# Patient Record
Sex: FEMALE | Race: BLACK OR AFRICAN AMERICAN | NOT HISPANIC OR LATINO | URBAN - METROPOLITAN AREA
[De-identification: names, ages, dates, MRNs, and addresses within clinical notes are randomized per-mention and may not be internally consistent; named-entity substitution may affect disease eponyms.]

---

## 2020-10-01 ENCOUNTER — INPATIENT (INPATIENT)
Facility: HOSPITAL | Age: 65
LOS: 4 days | Discharge: HOME HEALTH | End: 2020-10-06
Attending: SURGERY | Admitting: SURGERY
Payer: COMMERCIAL

## 2020-10-01 VITALS
SYSTOLIC BLOOD PRESSURE: 155 MMHG | WEIGHT: 214.95 LBS | OXYGEN SATURATION: 97 % | HEIGHT: 62 IN | TEMPERATURE: 98 F | HEART RATE: 70 BPM | RESPIRATION RATE: 18 BRPM | DIASTOLIC BLOOD PRESSURE: 69 MMHG

## 2020-10-01 DIAGNOSIS — Z90.710 ACQUIRED ABSENCE OF BOTH CERVIX AND UTERUS: Chronic | ICD-10-CM

## 2020-10-01 DIAGNOSIS — Z98.891 HISTORY OF UTERINE SCAR FROM PREVIOUS SURGERY: Chronic | ICD-10-CM

## 2020-10-01 LAB
ALBUMIN SERPL ELPH-MCNC: 3.6 G/DL — SIGNIFICANT CHANGE UP (ref 3.3–5)
ALP SERPL-CCNC: 81 U/L — SIGNIFICANT CHANGE UP (ref 40–120)
ALT FLD-CCNC: 86 U/L — HIGH (ref 12–78)
ANION GAP SERPL CALC-SCNC: 7 MMOL/L — SIGNIFICANT CHANGE UP (ref 5–17)
APPEARANCE UR: CLEAR — SIGNIFICANT CHANGE UP
AST SERPL-CCNC: 33 U/L — SIGNIFICANT CHANGE UP (ref 15–37)
BASOPHILS # BLD AUTO: 0.04 K/UL — SIGNIFICANT CHANGE UP (ref 0–0.2)
BASOPHILS NFR BLD AUTO: 0.4 % — SIGNIFICANT CHANGE UP (ref 0–2)
BILIRUB SERPL-MCNC: 0.5 MG/DL — SIGNIFICANT CHANGE UP (ref 0.2–1.2)
BILIRUB UR-MCNC: NEGATIVE — SIGNIFICANT CHANGE UP
BUN SERPL-MCNC: 11 MG/DL — SIGNIFICANT CHANGE UP (ref 7–23)
CALCIUM SERPL-MCNC: 9 MG/DL — SIGNIFICANT CHANGE UP (ref 8.5–10.1)
CHLORIDE SERPL-SCNC: 105 MMOL/L — SIGNIFICANT CHANGE UP (ref 96–108)
CO2 SERPL-SCNC: 27 MMOL/L — SIGNIFICANT CHANGE UP (ref 22–31)
COLOR SPEC: YELLOW — SIGNIFICANT CHANGE UP
CREAT SERPL-MCNC: 0.87 MG/DL — SIGNIFICANT CHANGE UP (ref 0.5–1.3)
DIFF PNL FLD: NEGATIVE — SIGNIFICANT CHANGE UP
EOSINOPHIL # BLD AUTO: 0.13 K/UL — SIGNIFICANT CHANGE UP (ref 0–0.5)
EOSINOPHIL NFR BLD AUTO: 1.2 % — SIGNIFICANT CHANGE UP (ref 0–6)
GLUCOSE BLDC GLUCOMTR-MCNC: 117 MG/DL — HIGH (ref 70–99)
GLUCOSE BLDC GLUCOMTR-MCNC: 151 MG/DL — HIGH (ref 70–99)
GLUCOSE BLDC GLUCOMTR-MCNC: 162 MG/DL — HIGH (ref 70–99)
GLUCOSE SERPL-MCNC: 230 MG/DL — HIGH (ref 70–99)
GLUCOSE UR QL: 50 MG/DL
HCT VFR BLD CALC: 39.8 % — SIGNIFICANT CHANGE UP (ref 34.5–45)
HGB BLD-MCNC: 12.8 G/DL — SIGNIFICANT CHANGE UP (ref 11.5–15.5)
IMM GRANULOCYTES NFR BLD AUTO: 0.3 % — SIGNIFICANT CHANGE UP (ref 0–1.5)
KETONES UR-MCNC: NEGATIVE — SIGNIFICANT CHANGE UP
LEUKOCYTE ESTERASE UR-ACNC: NEGATIVE — SIGNIFICANT CHANGE UP
LIDOCAIN IGE QN: 168 U/L — SIGNIFICANT CHANGE UP (ref 73–393)
LYMPHOCYTES # BLD AUTO: 2.95 K/UL — SIGNIFICANT CHANGE UP (ref 1–3.3)
LYMPHOCYTES # BLD AUTO: 27.4 % — SIGNIFICANT CHANGE UP (ref 13–44)
MCHC RBC-ENTMCNC: 28.1 PG — SIGNIFICANT CHANGE UP (ref 27–34)
MCHC RBC-ENTMCNC: 32.2 GM/DL — SIGNIFICANT CHANGE UP (ref 32–36)
MCV RBC AUTO: 87.5 FL — SIGNIFICANT CHANGE UP (ref 80–100)
MONOCYTES # BLD AUTO: 0.58 K/UL — SIGNIFICANT CHANGE UP (ref 0–0.9)
MONOCYTES NFR BLD AUTO: 5.4 % — SIGNIFICANT CHANGE UP (ref 2–14)
NEUTROPHILS # BLD AUTO: 7.03 K/UL — SIGNIFICANT CHANGE UP (ref 1.8–7.4)
NEUTROPHILS NFR BLD AUTO: 65.3 % — SIGNIFICANT CHANGE UP (ref 43–77)
NITRITE UR-MCNC: NEGATIVE — SIGNIFICANT CHANGE UP
NRBC # BLD: 0 /100 WBCS — SIGNIFICANT CHANGE UP (ref 0–0)
PH UR: 6 — SIGNIFICANT CHANGE UP (ref 5–8)
PLATELET # BLD AUTO: 300 K/UL — SIGNIFICANT CHANGE UP (ref 150–400)
POTASSIUM SERPL-MCNC: 3.6 MMOL/L — SIGNIFICANT CHANGE UP (ref 3.5–5.3)
POTASSIUM SERPL-SCNC: 3.6 MMOL/L — SIGNIFICANT CHANGE UP (ref 3.5–5.3)
PROT SERPL-MCNC: 8.4 GM/DL — HIGH (ref 6–8.3)
PROT UR-MCNC: NEGATIVE MG/DL — SIGNIFICANT CHANGE UP
RBC # BLD: 4.55 M/UL — SIGNIFICANT CHANGE UP (ref 3.8–5.2)
RBC # FLD: 13 % — SIGNIFICANT CHANGE UP (ref 10.3–14.5)
SARS-COV-2 RNA SPEC QL NAA+PROBE: SIGNIFICANT CHANGE UP
SODIUM SERPL-SCNC: 139 MMOL/L — SIGNIFICANT CHANGE UP (ref 135–145)
SP GR SPEC: 1.01 — SIGNIFICANT CHANGE UP (ref 1.01–1.02)
UROBILINOGEN FLD QL: NEGATIVE MG/DL — SIGNIFICANT CHANGE UP
WBC # BLD: 10.76 K/UL — HIGH (ref 3.8–10.5)
WBC # FLD AUTO: 10.76 K/UL — HIGH (ref 3.8–10.5)

## 2020-10-01 PROCEDURE — 93010 ELECTROCARDIOGRAM REPORT: CPT

## 2020-10-01 PROCEDURE — 99222 1ST HOSP IP/OBS MODERATE 55: CPT

## 2020-10-01 PROCEDURE — 99285 EMERGENCY DEPT VISIT HI MDM: CPT

## 2020-10-01 PROCEDURE — 74177 CT ABD & PELVIS W/CONTRAST: CPT | Mod: 26

## 2020-10-01 PROCEDURE — 99221 1ST HOSP IP/OBS SF/LOW 40: CPT

## 2020-10-01 RX ORDER — GLUCAGON INJECTION, SOLUTION 0.5 MG/.1ML
1 INJECTION, SOLUTION SUBCUTANEOUS ONCE
Refills: 0 | Status: DISCONTINUED | OUTPATIENT
Start: 2020-10-01 | End: 2020-10-06

## 2020-10-01 RX ORDER — DEXTROSE 50 % IN WATER 50 %
12.5 SYRINGE (ML) INTRAVENOUS ONCE
Refills: 0 | Status: DISCONTINUED | OUTPATIENT
Start: 2020-10-01 | End: 2020-10-06

## 2020-10-01 RX ORDER — LISINOPRIL 2.5 MG/1
40 TABLET ORAL DAILY
Refills: 0 | Status: DISCONTINUED | OUTPATIENT
Start: 2020-10-01 | End: 2020-10-06

## 2020-10-01 RX ORDER — MULTIVIT-MIN/FERROUS GLUCONATE 9 MG/15 ML
1 LIQUID (ML) ORAL
Qty: 0 | Refills: 0 | DISCHARGE

## 2020-10-01 RX ORDER — NIFEDIPINE 30 MG
60 TABLET, EXTENDED RELEASE 24 HR ORAL DAILY
Refills: 0 | Status: DISCONTINUED | OUTPATIENT
Start: 2020-10-01 | End: 2020-10-06

## 2020-10-01 RX ORDER — ONDANSETRON 8 MG/1
4 TABLET, FILM COATED ORAL EVERY 6 HOURS
Refills: 0 | Status: DISCONTINUED | OUTPATIENT
Start: 2020-10-01 | End: 2020-10-06

## 2020-10-01 RX ORDER — MORPHINE SULFATE 50 MG/1
2 CAPSULE, EXTENDED RELEASE ORAL EVERY 6 HOURS
Refills: 0 | Status: DISCONTINUED | OUTPATIENT
Start: 2020-10-01 | End: 2020-10-06

## 2020-10-01 RX ORDER — ONDANSETRON 8 MG/1
4 TABLET, FILM COATED ORAL ONCE
Refills: 0 | Status: COMPLETED | OUTPATIENT
Start: 2020-10-01 | End: 2020-10-01

## 2020-10-01 RX ORDER — SODIUM CHLORIDE 9 MG/ML
1000 INJECTION, SOLUTION INTRAVENOUS
Refills: 0 | Status: DISCONTINUED | OUTPATIENT
Start: 2020-10-01 | End: 2020-10-06

## 2020-10-01 RX ORDER — PIPERACILLIN AND TAZOBACTAM 4; .5 G/20ML; G/20ML
3.38 INJECTION, POWDER, LYOPHILIZED, FOR SOLUTION INTRAVENOUS EVERY 8 HOURS
Refills: 0 | Status: DISCONTINUED | OUTPATIENT
Start: 2020-10-01 | End: 2020-10-06

## 2020-10-01 RX ORDER — METFORMIN HYDROCHLORIDE 850 MG/1
1 TABLET ORAL
Qty: 0 | Refills: 0 | DISCHARGE

## 2020-10-01 RX ORDER — DEXTROSE 50 % IN WATER 50 %
25 SYRINGE (ML) INTRAVENOUS ONCE
Refills: 0 | Status: DISCONTINUED | OUTPATIENT
Start: 2020-10-01 | End: 2020-10-06

## 2020-10-01 RX ORDER — DEXTROSE 50 % IN WATER 50 %
15 SYRINGE (ML) INTRAVENOUS ONCE
Refills: 0 | Status: DISCONTINUED | OUTPATIENT
Start: 2020-10-01 | End: 2020-10-06

## 2020-10-01 RX ORDER — INFLUENZA VIRUS VACCINE 15; 15; 15; 15 UG/.5ML; UG/.5ML; UG/.5ML; UG/.5ML
0.5 SUSPENSION INTRAMUSCULAR ONCE
Refills: 0 | Status: DISCONTINUED | OUTPATIENT
Start: 2020-10-01 | End: 2020-10-06

## 2020-10-01 RX ORDER — SODIUM CHLORIDE 9 MG/ML
1000 INJECTION INTRAMUSCULAR; INTRAVENOUS; SUBCUTANEOUS ONCE
Refills: 0 | Status: COMPLETED | OUTPATIENT
Start: 2020-10-01 | End: 2020-10-01

## 2020-10-01 RX ORDER — KETOROLAC TROMETHAMINE 30 MG/ML
15 SYRINGE (ML) INJECTION ONCE
Refills: 0 | Status: DISCONTINUED | OUTPATIENT
Start: 2020-10-01 | End: 2020-10-01

## 2020-10-01 RX ORDER — NIFEDIPINE 30 MG
1 TABLET, EXTENDED RELEASE 24 HR ORAL
Qty: 0 | Refills: 0 | DISCHARGE

## 2020-10-01 RX ORDER — ENOXAPARIN SODIUM 100 MG/ML
40 INJECTION SUBCUTANEOUS DAILY
Refills: 0 | Status: DISCONTINUED | OUTPATIENT
Start: 2020-10-01 | End: 2020-10-01

## 2020-10-01 RX ORDER — INSULIN LISPRO 100/ML
VIAL (ML) SUBCUTANEOUS EVERY 6 HOURS
Refills: 0 | Status: DISCONTINUED | OUTPATIENT
Start: 2020-10-01 | End: 2020-10-04

## 2020-10-01 RX ORDER — PIPERACILLIN AND TAZOBACTAM 4; .5 G/20ML; G/20ML
3.38 INJECTION, POWDER, LYOPHILIZED, FOR SOLUTION INTRAVENOUS ONCE
Refills: 0 | Status: COMPLETED | OUTPATIENT
Start: 2020-10-01 | End: 2020-10-01

## 2020-10-01 RX ADMIN — Medication 15 MILLIGRAM(S): at 09:32

## 2020-10-01 RX ADMIN — SODIUM CHLORIDE 1000 MILLILITER(S): 9 INJECTION INTRAMUSCULAR; INTRAVENOUS; SUBCUTANEOUS at 09:03

## 2020-10-01 RX ADMIN — SODIUM CHLORIDE 125 MILLILITER(S): 9 INJECTION, SOLUTION INTRAVENOUS at 17:52

## 2020-10-01 RX ADMIN — SODIUM CHLORIDE 125 MILLILITER(S): 9 INJECTION, SOLUTION INTRAVENOUS at 23:56

## 2020-10-01 RX ADMIN — ONDANSETRON 4 MILLIGRAM(S): 8 TABLET, FILM COATED ORAL at 09:29

## 2020-10-01 RX ADMIN — LISINOPRIL 40 MILLIGRAM(S): 2.5 TABLET ORAL at 17:45

## 2020-10-01 RX ADMIN — PIPERACILLIN AND TAZOBACTAM 200 GRAM(S): 4; .5 INJECTION, POWDER, LYOPHILIZED, FOR SOLUTION INTRAVENOUS at 13:56

## 2020-10-01 RX ADMIN — Medication 15 MILLIGRAM(S): at 09:03

## 2020-10-01 RX ADMIN — Medication 60 MILLIGRAM(S): at 17:45

## 2020-10-01 RX ADMIN — PIPERACILLIN AND TAZOBACTAM 25 GRAM(S): 4; .5 INJECTION, POWDER, LYOPHILIZED, FOR SOLUTION INTRAVENOUS at 23:39

## 2020-10-01 NOTE — ED PROVIDER NOTE - OBJECTIVE STATEMENT
63yo F w/ PMH HTN, DM pw several days of diffuse lower abd discomfort, pressure which radiates into her low back. Pt describes pain as twisting, cramping. Last BM 5-6 days ago. Denies previous similar. Pt taking metamucil w/o relief. Pt endorses nausea, feeling flushed / chills, sweaty this AM. Pt endorses urinary frequency. ROS otherwise neg: Denies fever, CP, SOB, vomiting, diarrhea, dysuria.     PMH HTN, DM, meds quinapril, metformin, nifedipine, NKDA, PSH , fibroids. 65yo F w/ PMH HTN, DM pw several days of diffuse lower abd discomfort, pressure which radiates into her low back. Pt describes pain as twisting, cramping. Last BM 5-6 days ago. Denies previous similar. Pt taking metamucil w/o relief. Pt endorses nausea, feeling flushed / chills, sweaty this AM. Pt endorses urinary frequency. ROS otherwise neg: Denies fever, CP, SOB, vomiting, diarrhea, dysuria.     PMH HTN, DM, meds quinapril, metformin, nifedipine, NKDA, PSH , total hysterectomy

## 2020-10-01 NOTE — CONSULT NOTE ADULT - SUBJECTIVE AND OBJECTIVE BOX
IR consulted for pelvic drain placement.  Pt c/o worsening pelvic pain x 1 week, constipation, also hot/col chills.            HPI:  65 female with PMH of HTN, DM, PSH: GISELLE, present to ER with complaints of lower abdominal pain X2days. Pt states she started having mild abdominal pain and constipation last week, she took metamucil without any relief, only hand small hard "pebble-like" stool. Pt states abdominal pain progressively got worse and this morning is 10/10, sharp, radiating to lower back, pain is worse when she valsalva. Denies N/V/D, no fever/chill, NO CP/SOB, No urinary urgency, frequency or dysuria. Last BM was 4days ago, +flatus. (01 Oct 2020 12:45)          PAST MEDICAL & SURGICAL HISTORY:  HTN (hypertension)    DM (diabetes mellitus)    H/O:     S/P GISELLE (total abdominal hysterectomy)        Allergies    No Known Allergies    Intolerances        MEDICATIONS  (STANDING):  dextrose 5%. 1000 milliLiter(s) (50 mL/Hr) IV Continuous <Continuous>  dextrose 50% Injectable 12.5 Gram(s) IV Push once  dextrose 50% Injectable 25 Gram(s) IV Push once  dextrose 50% Injectable 25 Gram(s) IV Push once  insulin lispro (HumaLOG) corrective regimen sliding scale   SubCutaneous every 6 hours  lisinopril 40 milliGRAM(s) Oral daily  NIFEdipine XL 60 milliGRAM(s) Oral daily  piperacillin/tazobactam IVPB.. 3.375 Gram(s) IV Intermittent every 8 hours  sodium chloride 0.45%. 1000 milliLiter(s) (125 mL/Hr) IV Continuous <Continuous>    MEDICATIONS  (PRN):  dextrose 40% Gel 15 Gram(s) Oral once PRN Blood Glucose LESS THAN 70 milliGRAM(s)/deciliter  glucagon  Injectable 1 milliGRAM(s) IntraMuscular once PRN Glucose LESS THAN 70 milligrams/deciliter  morphine  - Injectable 2 milliGRAM(s) IV Push every 6 hours PRN Moderate Pain (4 - 6)  ondansetron Injectable 4 milliGRAM(s) IV Push every 6 hours PRN Nausea and/or Vomiting        SOCIAL HISTORY:    FAMILY HISTORY:  FH: type 2 diabetes    FH: HTN (hypertension)          PHYSICAL EXAM:    Vital Signs Last 24 Hrs  T(C): 36.9 (01 Oct 2020 11:35), Max: 36.9 (01 Oct 2020 11:35)  T(F): 98.4 (01 Oct 2020 11:35), Max: 98.4 (01 Oct 2020 11:35)  HR: 70 (01 Oct 2020 11:35) (70 - 70)  BP: 140/79 (01 Oct 2020 11:35) (140/79 - 155/69)  BP(mean): --  RR: 18 (01 Oct 2020 11:35) (18 - 18)  SpO2: 96% (01 Oct 2020 11:35) (96% - 97%)    General:  Appears stated age, well-groomed, well-nourished, no distress  Lungs:  CTAB  Cardiovascular:  good S1, S2,   Abdomen:  see A/P; obese   Extremities:  no calf tenderness/swelling b/l  Neuro/Psych:  A &O x 3    LABS:                        12.8   10.76 )-----------( 300      ( 01 Oct 2020 09:10 )             39.8     10-    139  |  105  |  11  ----------------------------<  230<H>  3.6   |  27  |  0.87    Ca    9.0      01 Oct 2020 09:10    TPro  8.4<H>  /  Alb  3.6  /  TBili  0.5  /  DBili  x   /  AST  33  /  ALT  86<H>  /  AlkPhos  81  10-01      Urinalysis Basic - ( 01 Oct 2020 11:08 )    Color: Yellow / Appearance: Clear / S.010 / pH: x  Gluc: x / Ketone: Negative  / Bili: Negative / Urobili: Negative mg/dL   Blood: x / Protein: Negative mg/dL / Nitrite: Negative   Leuk Esterase: Negative / RBC: x / WBC x   Sq Epi: x / Non Sq Epi: x / Bacteria: x        RADIOLOGY & ADDITIONAL STUDIES:  < from: CT Abdomen and Pelvis w/ IV Cont (10.01.20 @ 11:17) >  IMPRESSION:  4.4 x 5.2 x 5.5 cm collection in the cul-de-sac, likely diverticular abscess as above. Differential diagnosis includes cystic ovarian mass if patient has not had oophorectomy.    Nonobstructing left renal calculus.    Hepatomegaly and hepatic steatosis.    < end of copied text >

## 2020-10-01 NOTE — ED ADULT NURSE NOTE - OBJECTIVE STATEMENT
pt a&O x4, pt c.o of LLQ abd pressure, fullness radiating to left flank. pt denies hematuria, N/V/D. pt states last bm sunday hard to pass very minimal.

## 2020-10-01 NOTE — H&P ADULT - NSHPPHYSICALEXAM_GEN_ALL_CORE
GEN: Awake, alert, interactive.   HEAD AND NECK: NC/AT. Airway patent. Neck supple.   EYES:  Clear b/l. EOMI. PERRL.   ENT: Moist mucus membranes.   CARDIAC: Regular rate, regular rhythm. No evident pedal edema.    RESP/CHEST: Normal respiratory effort with no use of accessory muscles or retractions. Clear throughout on auscultation.  ABD: Soft, obese, TTP in lower abd. No rebound, no guarding.   BACK: No midline spinal TTP. No CVAT.   EXTREMITIES: Moving all extremities with no apparent deformities.   SKIN: Warm, dry, intact normal color. No rash.   NEURO: AOx3, CN II-XII grossly intact, no focal deficits.   PSYCH: Appropriate mood and affect.

## 2020-10-01 NOTE — H&P ADULT - HISTORY OF PRESENT ILLNESS
65 female with PMH of HTN, DM, PSH: GISELLE, present to ER with complaints of lower abdominal pain X2days. Pt states she started having mild abdominal pain and constipation last week, she took metamucil without any relief, only hand small hard "pebble-like" stool. Pt states abdominal pain progressively got worse and this morning is 10/10, sharp, radiating to lower back, pain is worse when she valsalva. Denies N/V/D, no fever/chill, NO CP/SOB, No urinary urgency, frequency or dysuria. Last BM was 4days ago, +flatus.

## 2020-10-01 NOTE — ED ADULT NURSE NOTE - BREATH SOUNDS, MLM
Dr. Wegener,   Patient rescheduled appointment on 2/11/2020 at 9 am.      I cancelled today's appt. I pended the 2 meds and pharmacy loaded.      Thanks! Soco Pimentel RN      Clear

## 2020-10-01 NOTE — H&P ADULT - ATTENDING COMMENTS
Ms. Mendoza seen during IR procedure. Overall improving. Hematoma, possibly infected aspirated and drain placed. Will continue broad spectrum antibiotics and follow-up drain results.

## 2020-10-01 NOTE — ED PROVIDER NOTE - CLINICAL SUMMARY MEDICAL DECISION MAKING FREE TEXT BOX
65yo F w/ PMH HTN, DM pw days of diffuse abd pressure, distension, twisting / cramping discomfort, associated w/ nausea, constipation. AFVSS. On exam, well appearing, abd soft, + distended and mild diffuse TTP. Plan: Obtain ECG, CBC, CMP, lipase, UA/C, CT AP w/ contrast. R/o SBO, diverticulitis, constipation. Give Toradol, Zofran, IVF. Re-eval. 63yo F w/ PMH HTN, DM pw days of diffuse abd pressure, distension, twisting / cramping discomfort, associated w/ nausea, constipation. AFVSS. On exam, well appearing, abd soft, + distended and mild diffuse TTP. Plan: Obtain ECG, CBC, CMP, lipase, UA/C, CT AP w/ contrast. R/o SBO, diverticulitis, constipation. Give Toradol, Zofran, IVF. Re-eval. CBC, CMP, lipase w/o significant abnormalities. UA w/o evidence of infection. Discussed CT findings w/ Radiology (Dr Bearden) + diverticular abscess. Consulted Gen Surg. Will admit. Plan d/w pt, she understands and agrees w/ this plan.

## 2020-10-01 NOTE — ED PROVIDER NOTE - PHYSICAL EXAMINATION
GEN: Awake, alert, interactive.   HEAD AND NECK: NC/AT. Airway patent. Neck supple.   EYES:  Clear b/l. EOMI. PERRL.   ENT: Moist mucus membranes.   CARDIAC: Regular rate, regular rhythm. No evident pedal edema.    RESP/CHEST: Normal respiratory effort with no use of accessory muscles or retractions. Clear throughout on auscultation.  ABD: Soft, + distended, + mild diffuse TTP. No rebound, no guarding.   BACK: No midline spinal TTP. No CVAT.   EXTREMITIES: Moving all extremities with no apparent deformities.   SKIN: Warm, dry, intact normal color. No rash.   NEURO: AOx3, CN II-XII grossly intact, no focal deficits.   PSYCH: Appropriate mood and affect.

## 2020-10-01 NOTE — H&P ADULT - ASSESSMENT
66y/o female with HTN,DM, PSH of GISELLE no presenting with abdominal pain found to have diverticular abscess  - as discussed with Dr. Martinez, admit to surgery  - NPO/IVF/IV ABX  - IR consult for possible percutaneous drainage of abscess  - pain management  - DVT PPX  - medical management of HTN and DM

## 2020-10-01 NOTE — ED PROVIDER NOTE - PROGRESS NOTE DETAILS
Pt reports pain is much improved from this AM. Pt updated to results of lab work, imaging. Consulted Surgery for diverticular abscess noted on CT.

## 2020-10-02 LAB
A1C WITH ESTIMATED AVERAGE GLUCOSE RESULT: 7.7 % — HIGH (ref 4–5.6)
ANION GAP SERPL CALC-SCNC: 7 MMOL/L — SIGNIFICANT CHANGE UP (ref 5–17)
APTT BLD: 31.3 SEC — SIGNIFICANT CHANGE UP (ref 27.5–35.5)
BUN SERPL-MCNC: 5 MG/DL — LOW (ref 7–23)
CALCIUM SERPL-MCNC: 8.7 MG/DL — SIGNIFICANT CHANGE UP (ref 8.5–10.1)
CHLORIDE SERPL-SCNC: 104 MMOL/L — SIGNIFICANT CHANGE UP (ref 96–108)
CO2 SERPL-SCNC: 27 MMOL/L — SIGNIFICANT CHANGE UP (ref 22–31)
CREAT SERPL-MCNC: 0.65 MG/DL — SIGNIFICANT CHANGE UP (ref 0.5–1.3)
CULTURE RESULTS: SIGNIFICANT CHANGE UP
ESTIMATED AVERAGE GLUCOSE: 174 MG/DL — HIGH (ref 68–114)
GLUCOSE BLDC GLUCOMTR-MCNC: 142 MG/DL — HIGH (ref 70–99)
GLUCOSE BLDC GLUCOMTR-MCNC: 146 MG/DL — HIGH (ref 70–99)
GLUCOSE BLDC GLUCOMTR-MCNC: 164 MG/DL — HIGH (ref 70–99)
GLUCOSE BLDC GLUCOMTR-MCNC: 190 MG/DL — HIGH (ref 70–99)
GLUCOSE SERPL-MCNC: 168 MG/DL — HIGH (ref 70–99)
GRAM STN FLD: SIGNIFICANT CHANGE UP
HCT VFR BLD CALC: 40.2 % — SIGNIFICANT CHANGE UP (ref 34.5–45)
HCV AB S/CO SERPL IA: 0.1 S/CO — SIGNIFICANT CHANGE UP (ref 0–0.99)
HCV AB SERPL-IMP: SIGNIFICANT CHANGE UP
HGB BLD-MCNC: 13.1 G/DL — SIGNIFICANT CHANGE UP (ref 11.5–15.5)
INR BLD: 1.19 RATIO — HIGH (ref 0.88–1.16)
MAGNESIUM SERPL-MCNC: 2.3 MG/DL — SIGNIFICANT CHANGE UP (ref 1.6–2.6)
MCHC RBC-ENTMCNC: 28.4 PG — SIGNIFICANT CHANGE UP (ref 27–34)
MCHC RBC-ENTMCNC: 32.6 GM/DL — SIGNIFICANT CHANGE UP (ref 32–36)
MCV RBC AUTO: 87.2 FL — SIGNIFICANT CHANGE UP (ref 80–100)
NRBC # BLD: 0 /100 WBCS — SIGNIFICANT CHANGE UP (ref 0–0)
PHOSPHATE SERPL-MCNC: 2.4 MG/DL — LOW (ref 2.5–4.5)
PLATELET # BLD AUTO: 287 K/UL — SIGNIFICANT CHANGE UP (ref 150–400)
POTASSIUM SERPL-MCNC: 3.6 MMOL/L — SIGNIFICANT CHANGE UP (ref 3.5–5.3)
POTASSIUM SERPL-SCNC: 3.6 MMOL/L — SIGNIFICANT CHANGE UP (ref 3.5–5.3)
PROTHROM AB SERPL-ACNC: 13.7 SEC — HIGH (ref 10.6–13.6)
RBC # BLD: 4.61 M/UL — SIGNIFICANT CHANGE UP (ref 3.8–5.2)
RBC # FLD: 12.9 % — SIGNIFICANT CHANGE UP (ref 10.3–14.5)
SARS-COV-2 IGG SERPL QL IA: NEGATIVE — SIGNIFICANT CHANGE UP
SARS-COV-2 IGM SERPL IA-ACNC: 0.09 INDEX — SIGNIFICANT CHANGE UP
SODIUM SERPL-SCNC: 138 MMOL/L — SIGNIFICANT CHANGE UP (ref 135–145)
SPECIMEN SOURCE: SIGNIFICANT CHANGE UP
SPECIMEN SOURCE: SIGNIFICANT CHANGE UP
WBC # BLD: 10.37 K/UL — SIGNIFICANT CHANGE UP (ref 3.8–10.5)
WBC # FLD AUTO: 10.37 K/UL — SIGNIFICANT CHANGE UP (ref 3.8–10.5)

## 2020-10-02 PROCEDURE — 49406 IMAGE CATH FLUID PERI/RETRO: CPT

## 2020-10-02 PROCEDURE — 99223 1ST HOSP IP/OBS HIGH 75: CPT

## 2020-10-02 RX ORDER — POTASSIUM PHOSPHATE, MONOBASIC POTASSIUM PHOSPHATE, DIBASIC 236; 224 MG/ML; MG/ML
15 INJECTION, SOLUTION INTRAVENOUS ONCE
Refills: 0 | Status: COMPLETED | OUTPATIENT
Start: 2020-10-02 | End: 2020-10-02

## 2020-10-02 RX ORDER — POTASSIUM PHOSPHATE, MONOBASIC POTASSIUM PHOSPHATE, DIBASIC 236; 224 MG/ML; MG/ML
15 INJECTION, SOLUTION INTRAVENOUS ONCE
Refills: 0 | Status: DISCONTINUED | OUTPATIENT
Start: 2020-10-02 | End: 2020-10-02

## 2020-10-02 RX ORDER — CHLORHEXIDINE GLUCONATE 213 G/1000ML
15 SOLUTION TOPICAL
Refills: 0 | Status: DISCONTINUED | OUTPATIENT
Start: 2020-10-02 | End: 2020-10-02

## 2020-10-02 RX ORDER — CHLORHEXIDINE GLUCONATE 213 G/1000ML
15 SOLUTION TOPICAL
Refills: 0 | Status: COMPLETED | OUTPATIENT
Start: 2020-10-02 | End: 2020-10-03

## 2020-10-02 RX ADMIN — PIPERACILLIN AND TAZOBACTAM 25 GRAM(S): 4; .5 INJECTION, POWDER, LYOPHILIZED, FOR SOLUTION INTRAVENOUS at 06:44

## 2020-10-02 RX ADMIN — SODIUM CHLORIDE 125 MILLILITER(S): 9 INJECTION, SOLUTION INTRAVENOUS at 11:45

## 2020-10-02 RX ADMIN — PIPERACILLIN AND TAZOBACTAM 25 GRAM(S): 4; .5 INJECTION, POWDER, LYOPHILIZED, FOR SOLUTION INTRAVENOUS at 14:41

## 2020-10-02 RX ADMIN — SODIUM CHLORIDE 125 MILLILITER(S): 9 INJECTION, SOLUTION INTRAVENOUS at 21:24

## 2020-10-02 RX ADMIN — PIPERACILLIN AND TAZOBACTAM 25 GRAM(S): 4; .5 INJECTION, POWDER, LYOPHILIZED, FOR SOLUTION INTRAVENOUS at 21:24

## 2020-10-02 RX ADMIN — MORPHINE SULFATE 2 MILLIGRAM(S): 50 CAPSULE, EXTENDED RELEASE ORAL at 17:11

## 2020-10-02 RX ADMIN — Medication 1: at 06:43

## 2020-10-02 RX ADMIN — CHLORHEXIDINE GLUCONATE 15 MILLILITER(S): 213 SOLUTION TOPICAL at 17:16

## 2020-10-02 RX ADMIN — Medication 1: at 23:27

## 2020-10-02 RX ADMIN — LISINOPRIL 40 MILLIGRAM(S): 2.5 TABLET ORAL at 06:43

## 2020-10-02 RX ADMIN — MORPHINE SULFATE 2 MILLIGRAM(S): 50 CAPSULE, EXTENDED RELEASE ORAL at 16:17

## 2020-10-02 RX ADMIN — POTASSIUM PHOSPHATE, MONOBASIC POTASSIUM PHOSPHATE, DIBASIC 62.5 MILLIMOLE(S): 236; 224 INJECTION, SOLUTION INTRAVENOUS at 06:43

## 2020-10-02 RX ADMIN — Medication 60 MILLIGRAM(S): at 06:44

## 2020-10-02 NOTE — MEDICAL STUDENT PROGRESS NOTE(EDUCATION) - SUBJECTIVE AND OBJECTIVE BOX
GENERAL SURGERY PROGRESS NOTE    Patient: YISEL ANTHONY , 64y ([birth year])Female   MRN: [id number]  Location: [facility] [address]  Visit: 10-01-20 Inpatient  Date: 10-02-20 @ 06:17    Hospital Day #:  Post-Op Day #:    Events of past 24 hours:    PAST MEDICAL & SURGICAL HISTORY:  HTN (hypertension)    DM (diabetes mellitus)    H/O:     S/P GISELLE (total abdominal hysterectomy)        Vitals: T(F): 98.4 (10-01-20 @ 23:18), Max: 99.7 (10-01-20 @ 17:30)  HR: 96 (10-01-20 @ 23:18)  BP: 142/65 (10-01-20 @ 23:18)  BP(mean): --  RR: 19 (10-01-20 @ 23:18)  SpO2: 96% (10-01-20 @ 23:18)      Diet, NPO:   Except Medications      10-01-20 @ 07:01  -  10-02-20 @ 06:17  --------------------------------------------------------  IN:    IV PiggyBack: 100 mL    sodium chloride 0.45%: 1500 mL  Total IN: 1600 mL    OUT:  Total OUT: 0 mL    Total NET: 1600 mL        Bowel Movement:  Flatus:     PHYSICAL EXAM  GEN:  CV:  LUNGS:  ABD:  EXT:  WOUND:  INCISION:    MEDICATIONS  (STANDING):  dextrose 5%. 1000 milliLiter(s) (50 mL/Hr) IV Continuous <Continuous>  dextrose 50% Injectable 12.5 Gram(s) IV Push once  dextrose 50% Injectable 25 Gram(s) IV Push once  dextrose 50% Injectable 25 Gram(s) IV Push once  influenza   Vaccine 0.5 milliLiter(s) IntraMuscular once  insulin lispro (HumaLOG) corrective regimen sliding scale   SubCutaneous every 6 hours  lisinopril 40 milliGRAM(s) Oral daily  NIFEdipine XL 60 milliGRAM(s) Oral daily  piperacillin/tazobactam IVPB.. 3.375 Gram(s) IV Intermittent every 8 hours  potassium phosphate IVPB 15 milliMole(s) IV Intermittent once  sodium chloride 0.45%. 1000 milliLiter(s) (125 mL/Hr) IV Continuous <Continuous>    MEDICATIONS  (PRN):  dextrose 40% Gel 15 Gram(s) Oral once PRN Blood Glucose LESS THAN 70 milliGRAM(s)/deciliter  glucagon  Injectable 1 milliGRAM(s) IntraMuscular once PRN Glucose LESS THAN 70 milligrams/deciliter  morphine  - Injectable 2 milliGRAM(s) IV Push every 6 hours PRN Moderate Pain (4 - 6)  ondansetron Injectable 4 milliGRAM(s) IV Push every 6 hours PRN Nausea and/or Vomiting      DVT PROPHYLAXIS: [] YES [] NO   GI PROPHYLAXIS: [] YES [] NO   ANTICOAGULATION: [] YES [] NO   ANTIBIOTICS: [] YES [] NO piperacillin/tazobactam IVPB.. 3.375 Gram(s)        LAB/STUDIES:  CAPILLARY BLOOD GLUCOSE      POCT Blood Glucose.: 151 mg/dL (01 Oct 2020 23:37)  POCT Blood Glucose.: 117 mg/dL (01 Oct 2020 17:47)  POCT Blood Glucose.: 162 mg/dL (01 Oct 2020 13:53)                          13.1   10.37 )-----------( 287      ( 02 Oct 2020 04:25 )             40.2     10-02    138  |  104  |  5<L>  ----------------------------<  168<H>  3.6   |  27  |  0.65    Ca    8.7      02 Oct 2020 04:25  Phos  2.4     10-02  Mg     2.3     10-    TPro  8.4<H>  /  Alb  3.6  /  TBili  0.5  /  DBili  x   /  AST  33  /  ALT  86<H>  /  AlkPhos  81  1001               8.4  | 0.5  | 33       ------------------[81      ( 01 Oct 2020 09:10 )  3.6  | x    | 86          Lipase:168    Amylase:x        PT/INR - ( 02 Oct 2020 04:25 )   PT: 13.7 sec;   INR: 1.19 ratio         PTT - ( 02 Oct 2020 04:25 )  PTT:31.3 sec  Urinalysis Basic - ( 01 Oct 2020 11:08 )    Color: Yellow / Appearance: Clear / S.010 / pH: x  Gluc: x / Ketone: Negative  / Bili: Negative / Urobili: Negative mg/dL   Blood: x / Protein: Negative mg/dL / Nitrite: Negative   Leuk Esterase: Negative / RBC: x / WBC x   Sq Epi: x / Non Sq Epi: x / Bacteria: x              IMAGING:    Assessment:  64yFemale patient admitted with .    Plan:      Date/Time: 10-02-20 @ 06:17   GENERAL SURGERY PROGRESS NOTE    Patient: YISEL ANTHONY , 64y ([birth year])Female   MRN: [id number]  Location: [facility] [address]  Visit: 10-01-20 Inpatient  Date: 10-02-20 @ 06:17    Hospital Day #:1    Events of past 24 hours: Overnight, patient reports that her pain has been well controlled with 2mg morphine as needed, now at occasionally 2-3/10 sharp pain in the suprapubic area/ lower abdomen that is nonradiating. Denies any n/v/f/c overnight. Reports no BM but has flatus. She is currently NPO and understands that she will likely go for IR drainage today for the pelvic accumulation.    PAST MEDICAL & SURGICAL HISTORY:  HTN (hypertension)    DM (diabetes mellitus)    H/O:     S/P GISELLE (total abdominal hysterectomy)        Vitals: T(F): 98.4 (10-01-20 @ 23:18), Max: 99.7 (10-01-20 @ 17:30)  HR: 96 (10-01-20 @ 23:18)  BP: 142/65 (10-01-20 @ 23:18)  BP(mean): --  RR: 19 (10-01-20 @ 23:18)  SpO2: 96% (10-01-20 @ 23:18)      Diet, NPO:   Except Medications      10-01-20 @ 07:01  -  10-02-20 @ 06:17  --------------------------------------------------------  IN:    IV PiggyBack: 100 mL    sodium chloride 0.45%: 1500 mL  Total IN: 1600 mL    OUT:  Total OUT: 0 mL    Total NET: 1600 mL        Bowel Movement: no  Flatus: yes    PHYSICAL EXAM  GEN: well appearing obese female in no distress  CV: RRR  LUNGS: Symmetric chest wall expansion nonlabored breathing  ABD: soft, nondistended, bowel sounds present. mild tenderness in lower abdomen with deep palpation no r/g  EXT: no deformity, no pedal edema      MEDICATIONS  (STANDING):  dextrose 5%. 1000 milliLiter(s) (50 mL/Hr) IV Continuous <Continuous>  dextrose 50% Injectable 12.5 Gram(s) IV Push once  dextrose 50% Injectable 25 Gram(s) IV Push once  dextrose 50% Injectable 25 Gram(s) IV Push once  influenza   Vaccine 0.5 milliLiter(s) IntraMuscular once  insulin lispro (HumaLOG) corrective regimen sliding scale   SubCutaneous every 6 hours  lisinopril 40 milliGRAM(s) Oral daily  NIFEdipine XL 60 milliGRAM(s) Oral daily  piperacillin/tazobactam IVPB.. 3.375 Gram(s) IV Intermittent every 8 hours  potassium phosphate IVPB 15 milliMole(s) IV Intermittent once  sodium chloride 0.45%. 1000 milliLiter(s) (125 mL/Hr) IV Continuous <Continuous>    MEDICATIONS  (PRN):  dextrose 40% Gel 15 Gram(s) Oral once PRN Blood Glucose LESS THAN 70 milliGRAM(s)/deciliter  glucagon  Injectable 1 milliGRAM(s) IntraMuscular once PRN Glucose LESS THAN 70 milligrams/deciliter  morphine  - Injectable 2 milliGRAM(s) IV Push every 6 hours PRN Moderate Pain (4 - 6)  ondansetron Injectable 4 milliGRAM(s) IV Push every 6 hours PRN Nausea and/or Vomiting      DVT PROPHYLAXIS: on hold  ANTIBIOTICS: [] YES [] NO piperacillin/tazobactam IVPB.. 3.375 Gram(s)        LAB/STUDIES:  CAPILLARY BLOOD GLUCOSE      POCT Blood Glucose.: 151 mg/dL (01 Oct 2020 23:37)  POCT Blood Glucose.: 117 mg/dL (01 Oct 2020 17:47)  POCT Blood Glucose.: 162 mg/dL (01 Oct 2020 13:53)                          13.1   10.37 )-----------( 287      ( 02 Oct 2020 04:25 )             40.2     10-02    138  |  104  |  5<L>  ----------------------------<  168<H>  3.6   |  27  |  0.65    Ca    8.7      02 Oct 2020 04:25  Phos  2.4     10-02  Mg     2.3     10    TPro  8.4<H>  /  Alb  3.6  /  TBili  0.5  /  DBili  x   /  AST  33  /  ALT  86<H>  /  AlkPhos  81  10               8.4  | 0.5  | 33       ------------------[81      ( 01 Oct 2020 09:10 )  3.6  | x    | 86          Lipase:168    Amylase:x        PT/INR - ( 02 Oct 2020 04:25 )   PT: 13.7 sec;   INR: 1.19 ratio         PTT - ( 02 Oct 2020 04:25 )  PTT:31.3 sec  Urinalysis Basic - ( 01 Oct 2020 11:08 )    Color: Yellow / Appearance: Clear / S.010 / pH: x  Gluc: x / Ketone: Negative  / Bili: Negative / Urobili: Negative mg/dL   Blood: x / Protein: Negative mg/dL / Nitrite: Negative   Leuk Esterase: Negative / RBC: x / WBC x   Sq Epi: x / Non Sq Epi: x / Bacteria: x              IMAGING: c< from: CT Abdomen and Pelvis w/ IV Cont (10.01.20 @ 11:17) >  IMPRESSION:  4.4 x 5.2 x 5.5 cm collection in the cul-de-sac, likely diverticular abscess as above. Differential diagnosis includes cystic ovarian mass if patient has not had oophorectomy.    Nonobstructing left renal calculus.    Hepatomegaly and hepatic steatosis.    < end of copied text >      Assessment:  64yFemale patient with DM, HTN, abdominal hysterectomy, C section. hospital day 1, admitted with 4.4x5.2x5.5cm pelvic cul de sac accumulation likely due to diverticulitis, currently NPO and stable    Plan:  Scheduled for IR drainage today  Repleted hypophosphatemia  IVF, NPO, morphine, zofran  Continue home medications  White count WNL, follow up CBC/CMP  monitor bowel function    Date/Time: 10-02-20 @ 06:17

## 2020-10-02 NOTE — PRE PROCEDURE NOTE - PRE PROCEDURE EVALUATION
Vascular & Interventional Radiology Pre-Procedure Note    Procedure Name:    Pelvic collection aspiration/drain placement      Allergies:   Medications (Abx/Cardiac/Anticoagulation/Blood Products)    lisinopril: 40 milliGRAM(s) Oral (10-02 @ 06:43)  NIFEdipine XL: 60 milliGRAM(s) Oral (10-02 @ 06:44)  piperacillin/tazobactam IVPB.: 200 mL/Hr IV Intermittent (10-01 @ 13:56)  piperacillin/tazobactam IVPB..: 25 mL/Hr IV Intermittent (10-02 @ 06:44)    Data:    T(C): 36.9  HR: 69  BP: 125/72  RR: 18  SpO2: 97%    Exam  General: _______A&Ox3  Chest: ____CTAB___  Abdomen: mildly TTP over lower quadrants, soft_______  Extremities: __no calf tenderness_____    -WBC 10.37 / HgB 13.1 / Hct 40.2 / Plt 287  -Na 138 / Cl 104 / BUN 5 / Glucose 168  -K 3.6 / CO2 27 / Cr 0.65  -ALT -- / Alk Phos -- / T.Bili --  -INR1.19    Imaging: _______< from: CT Abdomen and Pelvis w/ IV Cont (10.01.20 @ 11:17) >  IMPRESSION:  4.4 x 5.2 x 5.5 cm collection in the cul-de-sac, likely diverticular abscess as above. Differential diagnosis includes cystic ovarian mass if patient has not had oophorectomy.    Nonobstructing left renal calculus.    Hepatomegaly and hepatic steatosis.    < end of copied text >      Plan:   -64y Female presents for  Pelvic collection aspiration/drain placement.  Pt c/o worsening pelvic pain x 1 week, constipation, also hot/col chills.    CT shows a 4.4 x 5.2 x 5.5 cm collection in the cul-de-sac, likely diverticular abscess  Pt s/p GISELLE  No leukocytosis or fever,  other vitals stable.  Pt currently on zosyn    Plan   -will perform today CT guidance  -meds, labs and vitals reviewed  -Consent obtained

## 2020-10-02 NOTE — PROGRESS NOTE ADULT - SUBJECTIVE AND OBJECTIVE BOX
Patient seen and examined bedside resting comfortably.  Reports abdominal pain has improved since yesterday; has not required any analgesia overnight.  Pain had been "greater than 10/10" and is now mostly gone, occasionally 3/10 in severity, described as sharp and located in lower abdomen.  Denies nausea and vomiting. No fever/chills.  Denies chest pain, dyspnea, cough.    T(F): 98.4 (10-01-20 @ 23:18), Max: 99.7 (10-01-20 @ 17:30)  HR: 96 (10-01-20 @ 23:18) (66 - 96)  BP: 142/65 (10-01-20 @ 23:18) (128/71 - 164/93)  RR: 19 (10-01-20 @ 23:18) (18 - 19)  SpO2: 96% (10-01-20 @ 23:18) (96% - 98%)  Wt(kg): --    POCT Blood Glucose.: 151 mg/dL (01 Oct 2020 23:37)  POCT Blood Glucose.: 117 mg/dL (01 Oct 2020 17:47)  POCT Blood Glucose.: 162 mg/dL (01 Oct 2020 13:53)      PHYSICAL EXAM:  General: NAD, WDWN  Neuro:  Alert & oriented x 3  HEENT: NCAT, EOMI, conjunctiva clear  CV: +S1+S2 regular rate and rhythm  Lung: clear to auscultation bilaterally, respirations nonlabored, good inspiratory effort  Abdomen: obese, soft, ND. Nontender to palpation. Normoactive BS  Extremities: no pedal edema or calf tenderness noted     LABS:                        13.1   10.37 )-----------( 287      ( 02 Oct 2020 04:25 )             40.2     10-02    138  |  104  |  5<L>  ----------------------------<  168<H>  3.6   |  27  |  0.65    Ca    8.7      02 Oct 2020 04:25  Phos  2.4     10-02  Mg     2.3     10-02    TPro  8.4<H>  /  Alb  3.6  /  TBili  0.5  /  DBili  x   /  AST  33  /  ALT  86<H>  /  AlkPhos  81  10-01  PT/INR - ( 02 Oct 2020 04:25 )   PT: 13.7 sec;   INR: 1.19 ratio    PTT - ( 02 Oct 2020 04:25 )  PTT:31.3 sec    A/P: 65F PMH HTN, DM, PSH GISELLE admitted with diverticular abscess, clinically stable  hypophosphatemia repleted  continue NPO/IVF/IV ABX, analgesia prn  IR consult appreciated, for possible percutaneous drainage of abscess today  continue home DM and HTN medications

## 2020-10-02 NOTE — CONSULT NOTE ADULT - ASSESSMENT
Patient is a 64y old  Female who presents with a chief complaint of Abdominal pain found to have intraabdominal abscess (01 Oct 2020 12:45)    IR consulted for pelvic drain placement.    Pt c/o worsening pelvic pain x 1 week, constipation, also hot/col chills.    CT shows a 4.4 x 5.2 x 5.5 cm collection in the cul-de-sac, likely diverticular abscess  Pt s/p GISELLE  No leukocytosis or fever,  other vitals stable.  Pts abdomen is soft, NT, mildly TTP over lower abdomen.    Pt currently on zosyn      Plan  -will d/w IR attending, will keep NPO after midnight
Pelvic abscess, most likely diverticular in origin.   On adequate antibiotics  Patient states had BSO with GISELLE, though not clear why that would be done for fibroids.  No hx IBD  Due for colonoscopy, hx polyps 4y ago  No concern of fistulizing disease    Suggestions--  Continue antibiotics  Serial abdominal exams  Await IR drainage attempt  F/U cx data  Trend CBC, temp curve  Presently no surgical indication, need to any surgical intervention TBD    D/W patient in layman's terms, all questions answered to the best of my ability.    Dr. Valerie Santo covering the service this weekend. Please call [phone] for any ID issues that need attention     Thank you for the courtesy of this referral.  Hernesto Pacheco MD  Attending Physician  [facility]  Division of Infectious Diseases  [phone]

## 2020-10-02 NOTE — CONSULT NOTE ADULT - SUBJECTIVE AND OBJECTIVE BOX
Full note to follow  Presumptive diverticulitis with abscess  For attempt at IR drainage  Would continue Zosyn  Thank you for the courtesy of this referral.  Hernesto Pacheco MD  Attending Physician  [facility]  Division of Infectious Diseases  [phone]  ------------------------  [facility]  Division of Infectious Diseases  [phone]    YISEL ANTHONY  64y, Female  [id number]    HPI--      PMH/PSH--  HTN (hypertension)    DM (diabetes mellitus)    H/O:     S/P GISELLE (total abdominal hysterectomy)        Allergies--  No Known Allergies      Medications--  Antibiotics: piperacillin/tazobactam IVPB.. 3.375 Gram(s) IV Intermittent every 8 hours    Immunologic: influenza   Vaccine 0.5 milliLiter(s) IntraMuscular once    Other: chlorhexidine 0.12% Liquid PRN  dextrose 40% Gel PRN  dextrose 5%.  dextrose 50% Injectable  dextrose 50% Injectable  dextrose 50% Injectable  glucagon  Injectable PRN  insulin lispro (HumaLOG) corrective regimen sliding scale  lisinopril  morphine  - Injectable PRN  NIFEdipine XL  ondansetron Injectable PRN  sodium chloride 0.45%.    Antimicrobials last 90 days per EMR: MEDICATIONS  (STANDING):  piperacillin/tazobactam IVPB.   200 mL/Hr IV Intermittent (10-01-20 @ 13:56)    piperacillin/tazobactam IVPB..   25 mL/Hr IV Intermittent (10-02-20 @ 06:44)   25 mL/Hr IV Intermittent (10-01-20 @ 23:39)        Social History--  EtOH: denies   Tobacco: denies   Drug Use: denies     Family/Marital History--  FH: type 2 diabetes    FH: HTN (hypertension)          Travel/Environmental/Occupational History:      Review of Systems:  A >=10-point review of systems was obtained.     Pertinent positives and negatives--  Constitutional: No fevers. No Chills. No Rigors.   Eyes:  ENMT:  Cardiovascular: No chest pain. No palpitations.  Respiratory: No shortness of breath. No cough.  Gastrointestinal: No nausea or vomiting. No diarrhea or constipation.   Genitourinary:  Musculoskeletal:  Skin:  Neurologic:  Psychiatric: Pleasant. Appropriate affect.  Endocrine:  Heme/Lymphatic:  Allergy/Immunologic:    Review of systems otherwise negative except as previously noted.    Physical Exam--  Vital Signs: T(F): 98.4 (10-02-20 @ 05:06), Max: 99.7 (10-01-20 @ 17:30)  HR: 69 (10-02-20 @ 05:06)  BP: 125/72 (10-02-20 @ 05:06)  RR: 18 (10-02-20 @ 05:06)  SpO2: 97% (10-02-20 @ 05:06)  Wt(kg): --  General: Nontoxic-appearing Female in no acute distress.  HEENT: AT/NC. PERRL. EOMI. Anicteric. Conjunctiva pink and moist. Oropharynx clear. Dentition fair.  Neck: Not rigid. No sense of mass.  Nodes: None palpable.  Lungs: Clear bilaterally without rales, wheezing or rhonchi  Heart: Regular rate and rhythm. No Murmur. No rub. No gallop. No palpable thrill.  Abdomen: Bowel sounds present and normoactive. Soft. Nondistended. Nontender. No sense of mass. No organomegaly.  Back: No spinal tenderness. No costovertebral angle tenderness.   Extremities: No cyanosis or clubbing. No edema.   Skin: Warm. Dry. Good turgor. No rash. No vasculitic stigmata.  Psychiatric: Appropriate affect and mood for situation.         Laboratory & Imaging Data--  CBC                        13.1   10.37 )-----------( 287      ( 02 Oct 2020 04:25 )             40.2       Chemistries  10-02    138  |  104  |  5<L>  ----------------------------<  168<H>  3.6   |  27  |  0.65    Ca    8.7      02 Oct 2020 04:25  Phos  2.4     10  Mg     2.3     10-02    TPro  8.4<H>  /  Alb  3.6  /  TBili  0.5  /  DBili  x   /  AST  33  /  ALT  86<H>  /  AlkPhos  81  10      Culture Data    Culture - Urine (collected 01 Oct 2020 15:02)  Source: .Urine Clean Catch (Midstream)  Final Report (02 Oct 2020 11:26):    <10,000 CFU/mL Normal Urogenital Ethel         Full note to follow  Presumptive diverticulitis with abscess  For attempt at IR drainage  Would continue Zosyn  Thank you for the courtesy of this referral.  Hernesto Pacheco MD  Attending Physician  [facility]  Division of Infectious Diseases  [phone]  ------------------------  [facility]  Division of Infectious Diseases  [phone]    YISEL ANTHONY  64y, Female  [id number]    HPI--  64F with DM, HTN, GISELLE with BSO for fibroids/bleeding, presents with abdominal pain and constipation x4 days PTA. Pain became severe and she felt generally unwell. Denies fevers, chills, or rigors. No melena or BRBPR. No hx IBD. Had colonoscopy 4y ago, states had 2 small polyps. Was due for colonoscopy this spring but covid interceded. No similar episodes. No N/V. No pneumaturia/fecaluria.    Here CT c/w diverticualr abscess. IR drainage planned. Patient still with pain LLQ radiating to low back and is still concerned that she has not had a BM except for "a couple of sudeep."    PMH/PSH--  HTN (hypertension)    DM (diabetes mellitus)    H/O:     S/P GISELLE (total abdominal hysterectomy)        Allergies--  No Known Allergies      Medications--  Antibiotics: piperacillin/tazobactam IVPB.. 3.375 Gram(s) IV Intermittent every 8 hours    Immunologic: influenza   Vaccine 0.5 milliLiter(s) IntraMuscular once    Other: chlorhexidine 0.12% Liquid PRN  dextrose 40% Gel PRN  dextrose 5%.  dextrose 50% Injectable  dextrose 50% Injectable  dextrose 50% Injectable  glucagon  Injectable PRN  insulin lispro (HumaLOG) corrective regimen sliding scale  lisinopril  morphine  - Injectable PRN  NIFEdipine XL  ondansetron Injectable PRN  sodium chloride 0.45%.    Antimicrobials last 90 days per EMR: MEDICATIONS  (STANDING):  piperacillin/tazobactam IVPB.   200 mL/Hr IV Intermittent (10-01-20 @ 13:56)    piperacillin/tazobactam IVPB..   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   Review of systems otherwise negative except as previously noted.    Physical Exam--  Vital Signs: T(F): 98.4 (10-02-20 @ 05:06), Max: 99.7 (10-01-20 @ 17:30)  HR: 69 (10-02-20 @ 05:06)  BP: 125/72 (10-02-20 @ 05:06)  RR: 18 (10-02-20 @ 05:06)  SpO2: 97% (10-02-20 @ 05:06)  Wt(kg): --  General: Nontoxic-appearing Female in no acute distress.  HEENT: AT/NC. Anicteric. Conjunctiva pink and moist. Oropharynx clear.  Neck: Not rigid. No sense of mass.  Nodes: None palpable.  Lungs: Clear bilaterally without rales, wheezing or rhonchi  Heart: Regular rate and rhythm. No Murmur. No rub. No gallop. No palpable thrill.  Abdomen: Bowel sounds present and normoactive. Soft. Nondistended. Mildly tender LLQ> suprapubic without guarding or rebound. NT elsewhere.. No sense of mass. No organomegaly. Obese.   Back: No spinal tenderness. No costovertebral angle tenderness.   Extremities: No cyanosis or clubbing. No edema.   Skin: Warm. Dry. Good turgor. No rash. No vasculitic stigmata.  Psychiatric: Appropriate affect and mood for situation.         Laboratory & Imaging Data--  CBC                        13.1   10.37 )-----------( 287      ( 02 Oct 2020 04:25 )             40.2       Chemistries  10-02    138  |  104  |  5<L>  ----------------------------<  168<H>  3.6   |  27  |  0.65    Ca    8.7      02 Oct 2020 04:25  Phos  2.4     10-  Mg     2.3     10    TPro  8.4<H>  /  Alb  3.6  /  TBili  0.5  /  DBili  x   /  AST  33  /  ALT  86<H>  /  AlkPhos  81  10-01      Culture Data    Culture - Urine (collected 01 Oct 2020 15:02)  Source: .Urine Clean Catch (Midstream)  Final Report (02 Oct 2020 11:26):    <10,000 CFU/mL Normal Urogenital Ethel    < from: CT Abdomen and Pelvis w/ IV Cont (10.01.20 @ 11:17) >    EXAM:  CT ABDOMEN AND PELVIS IC                            PROCEDURE DATE:  10/01/2020          INTERPRETATION:  CLINICAL INDICATION: 64 years  Female with LLQ abd pain.    COMPARISON: None.    PROCEDURE:  CT of the Abdomen and Pelvis was performedwith intravenous contrast.  Intravenous contrast: 90 ml Omnipaque 350. 10 ml discarded.  Oral contrast: None.  Sagittal and coronal reformats were performed.    LIMITATION: Absence of enteric contrast limits evaluation of the GI tract.    FINDINGS:  LOWER CHEST: Mild right basilar scarring.    LIVER: Hepatic steatosis. Right lobe 19.1 cm sagittal.  BILE DUCTS: Normal caliber.  GALLBLADDER: Within normal limits.  SPLEEN: Within normal limits.  PANCREAS: Within normal limits.  ADRENALS: 6 mm right adrenal myelolipoma (2:40). Unremarkable left adrenal gland.  KIDNEYS/URETERS: 1.2 cm nonobstructing left lower pole calculus. No hydroureteronephrosis bilaterally. No perinephric edema.    BLADDER: Within normal limits.  REPRODUCTIVE ORGANS: Supracervical hysterectomy.    BOWEL: No bowel obstruction. 4.4 x 5.2 x 5.5 cm collection in the cul-de-sac (2:97), with surrounding fat stranding just inferior to a sigmoid diverticulum with surrounding fat stranding (2:94). Appendix is not visualized. No evidence of inflammation in the pericecal region. Mild retained fecal matter throughout the colon.  PERITONEUM: No ascites. Please see BOWEL findings.  VESSELS: Within normal limits.  RETROPERITONEUM/LYMPH NODES: No lymphadenopathy.  ABDOMINAL WALL: Postsurgical changes.  BONES: Thoracic degenerative changes. Grade 1 L4-5 anterolisthesis.    IMPRESSION:  4.4 x 5.2 x 5.5 cm collection in the cul-de-sac, likely diverticular abscess as above. Differential diagnosis includes cystic ovarian mass if patient has not had oophorectomy.    Nonobstructing left renal calculus.    Hepatomegaly and hepatic steatosis.    Findings were discussed with Dr. SUKHDEV SHAH [phone] 10/1/2020 11:34 AM by Dr. Kamara with read back confirmation.            MENDY KAMARA M.D., ATTENDING RADIOLOGIST  This document has been electronically signed. Oct  1 2020 11:40AM    < end of copied text >

## 2020-10-03 LAB
ANION GAP SERPL CALC-SCNC: 8 MMOL/L — SIGNIFICANT CHANGE UP (ref 5–17)
BASOPHILS # BLD AUTO: 0.05 K/UL — SIGNIFICANT CHANGE UP (ref 0–0.2)
BASOPHILS NFR BLD AUTO: 0.5 % — SIGNIFICANT CHANGE UP (ref 0–2)
BUN SERPL-MCNC: 7 MG/DL — SIGNIFICANT CHANGE UP (ref 7–23)
CALCIUM SERPL-MCNC: 9 MG/DL — SIGNIFICANT CHANGE UP (ref 8.5–10.1)
CHLORIDE SERPL-SCNC: 107 MMOL/L — SIGNIFICANT CHANGE UP (ref 96–108)
CO2 SERPL-SCNC: 25 MMOL/L — SIGNIFICANT CHANGE UP (ref 22–31)
CREAT SERPL-MCNC: 0.86 MG/DL — SIGNIFICANT CHANGE UP (ref 0.5–1.3)
EOSINOPHIL # BLD AUTO: 0.07 K/UL — SIGNIFICANT CHANGE UP (ref 0–0.5)
EOSINOPHIL NFR BLD AUTO: 0.7 % — SIGNIFICANT CHANGE UP (ref 0–6)
GLUCOSE BLDC GLUCOMTR-MCNC: 121 MG/DL — HIGH (ref 70–99)
GLUCOSE BLDC GLUCOMTR-MCNC: 137 MG/DL — HIGH (ref 70–99)
GLUCOSE BLDC GLUCOMTR-MCNC: 142 MG/DL — HIGH (ref 70–99)
GLUCOSE SERPL-MCNC: 153 MG/DL — HIGH (ref 70–99)
HCT VFR BLD CALC: 43 % — SIGNIFICANT CHANGE UP (ref 34.5–45)
HGB BLD-MCNC: 13.8 G/DL — SIGNIFICANT CHANGE UP (ref 11.5–15.5)
IMM GRANULOCYTES NFR BLD AUTO: 0.3 % — SIGNIFICANT CHANGE UP (ref 0–1.5)
LYMPHOCYTES # BLD AUTO: 2.36 K/UL — SIGNIFICANT CHANGE UP (ref 1–3.3)
LYMPHOCYTES # BLD AUTO: 21.9 % — SIGNIFICANT CHANGE UP (ref 13–44)
MAGNESIUM SERPL-MCNC: 2.3 MG/DL — SIGNIFICANT CHANGE UP (ref 1.6–2.6)
MCHC RBC-ENTMCNC: 27.8 PG — SIGNIFICANT CHANGE UP (ref 27–34)
MCHC RBC-ENTMCNC: 32.1 GM/DL — SIGNIFICANT CHANGE UP (ref 32–36)
MCV RBC AUTO: 86.7 FL — SIGNIFICANT CHANGE UP (ref 80–100)
MONOCYTES # BLD AUTO: 0.71 K/UL — SIGNIFICANT CHANGE UP (ref 0–0.9)
MONOCYTES NFR BLD AUTO: 6.6 % — SIGNIFICANT CHANGE UP (ref 2–14)
NEUTROPHILS # BLD AUTO: 7.54 K/UL — HIGH (ref 1.8–7.4)
NEUTROPHILS NFR BLD AUTO: 70 % — SIGNIFICANT CHANGE UP (ref 43–77)
NRBC # BLD: 0 /100 WBCS — SIGNIFICANT CHANGE UP (ref 0–0)
PHOSPHATE SERPL-MCNC: 2.8 MG/DL — SIGNIFICANT CHANGE UP (ref 2.5–4.5)
PLATELET # BLD AUTO: 331 K/UL — SIGNIFICANT CHANGE UP (ref 150–400)
POTASSIUM SERPL-MCNC: 3.6 MMOL/L — SIGNIFICANT CHANGE UP (ref 3.5–5.3)
POTASSIUM SERPL-SCNC: 3.6 MMOL/L — SIGNIFICANT CHANGE UP (ref 3.5–5.3)
RBC # BLD: 4.96 M/UL — SIGNIFICANT CHANGE UP (ref 3.8–5.2)
RBC # FLD: 12.9 % — SIGNIFICANT CHANGE UP (ref 10.3–14.5)
SODIUM SERPL-SCNC: 140 MMOL/L — SIGNIFICANT CHANGE UP (ref 135–145)
WBC # BLD: 10.76 K/UL — HIGH (ref 3.8–10.5)
WBC # FLD AUTO: 10.76 K/UL — HIGH (ref 3.8–10.5)

## 2020-10-03 PROCEDURE — 99232 SBSQ HOSP IP/OBS MODERATE 35: CPT

## 2020-10-03 RX ADMIN — PIPERACILLIN AND TAZOBACTAM 25 GRAM(S): 4; .5 INJECTION, POWDER, LYOPHILIZED, FOR SOLUTION INTRAVENOUS at 05:56

## 2020-10-03 RX ADMIN — SODIUM CHLORIDE 125 MILLILITER(S): 9 INJECTION, SOLUTION INTRAVENOUS at 21:52

## 2020-10-03 RX ADMIN — PIPERACILLIN AND TAZOBACTAM 25 GRAM(S): 4; .5 INJECTION, POWDER, LYOPHILIZED, FOR SOLUTION INTRAVENOUS at 21:52

## 2020-10-03 RX ADMIN — LISINOPRIL 40 MILLIGRAM(S): 2.5 TABLET ORAL at 05:56

## 2020-10-03 RX ADMIN — Medication 60 MILLIGRAM(S): at 05:56

## 2020-10-03 RX ADMIN — PIPERACILLIN AND TAZOBACTAM 25 GRAM(S): 4; .5 INJECTION, POWDER, LYOPHILIZED, FOR SOLUTION INTRAVENOUS at 14:22

## 2020-10-03 RX ADMIN — SODIUM CHLORIDE 125 MILLILITER(S): 9 INJECTION, SOLUTION INTRAVENOUS at 05:56

## 2020-10-03 NOTE — PROGRESS NOTE ADULT - ATTENDING COMMENTS
Patient seen and examined.  Pain much improved. No nausea/vomiting. Passing flatus.  AVSS  Abdomen soft, nt, nd  Glutial drain draining brown material, 25cc overnight.    Patient endorsing appetite, will advance to clear liquids.  Otherwise OOB/ambulate  Abx

## 2020-10-03 NOTE — PROGRESS NOTE ADULT - SUBJECTIVE AND OBJECTIVE BOX
SURGERY PROGRESS HPI:  Pt seen and examined at bedside. LLQ is improving. Pt denies complaints. No acute events overnight. Pt denies nausea and vomiting. Passed flatus overnight. No BM yet. Voiding well. Pt denies chest pain, SOB, dizziness, fever, chills. Ambulating to the bathroom.    Vital Signs Last 24 Hrs  T(C): 36.9 (03 Oct 2020 05:00), Max: 36.9 (03 Oct 2020 05:00)  T(F): 98.4 (03 Oct 2020 05:00), Max: 98.4 (03 Oct 2020 05:00)  HR: 89 (03 Oct 2020 05:00) (75 - 89)  BP: 130/80 (03 Oct 2020 05:00) (117/64 - 135/65)  BP(mean): --  RR: 18 (03 Oct 2020 05:00) (16 - 18)  SpO2: 95% (03 Oct 2020 05:00) (85% - 100%)      PHYSICAL EXAM:    GENERAL: NAD  CHEST/LUNG: Clear to ausculation, bilaterally   HEART: RRR S1S2  ABDOMEN: non distended, +BS, soft, non tender, no guarding  GLUTEAL: Transgluteal drain with serosanguinous output 25cc/24hrs  EXTREMITIES:  calf soft, non tender b/l    I&O's Detail    02 Oct 2020 07:01  -  03 Oct 2020 07:00  --------------------------------------------------------  IN:    IV PiggyBack: 200 mL    sodium chloride 0.45%: 1500 mL  Total IN: 1700 mL    OUT:    Drain (mL): 25 mL  Total OUT: 25 mL    Total NET: 1675 mL      LABS:                        13.8   10.76 )-----------( 331      ( 03 Oct 2020 06:54 )             43.0     10-02    138  |  104  |  5<L>  ----------------------------<  168<H>  3.6   |  27  |  0.65    Ca    8.7      02 Oct 2020 04:25  Phos  2.4     10-02  Mg     2.3     10-02    TPro  8.4<H>  /  Alb  3.6  /  TBili  0.5  /  DBili  x   /  AST  33  /  ALT  86<H>  /  AlkPhos  81  10-01    PT/INR - ( 02 Oct 2020 04:25 )   PT: 13.7 sec;   INR: 1.19 ratio         PTT - ( 02 Oct 2020 04:25 )  PTT:31.3 sec  Urinalysis Basic - ( 01 Oct 2020 11:08 )    Color: Yellow / Appearance: Clear / S.010 / pH: x  Gluc: x / Ketone: Negative  / Bili: Negative / Urobili: Negative mg/dL   Blood: x / Protein: Negative mg/dL / Nitrite: Negative   Leuk Esterase: Negative / RBC: x / WBC x   Sq Epi: x / Non Sq Epi: x / Bacteria: x    Culture Results:   <10,000 CFU/mL Normal Urogenital Ethel (10-01 @ 15:02)        Assessment: 65F PMH HTN, DM, PSH GISELLE admitted with diverticular abscess, clinically stable.   8.5Fr drain transgluteal pelvic abscess (30 cc brown fluid, ?old hematoma) 10/2  +Flatus    Plan:  IR drain care. Monitor output.  Continue Zosyn per ID  IV hydration. NPO for now.  Follow up cultures  continue home DM and HTN medications   Will d/w attending SURGERY PROGRESS HPI:  Pt seen and examined at bedside. LLQ pain is improving. Pt denies complaints. No acute events overnight. Pt denies nausea and vomiting. Passed flatus overnight. No BM yet. Voiding well. Pt denies chest pain, SOB, dizziness, fever, chills. Ambulating to the bathroom.    Vital Signs Last 24 Hrs  T(C): 36.9 (03 Oct 2020 05:00), Max: 36.9 (03 Oct 2020 05:00)  T(F): 98.4 (03 Oct 2020 05:00), Max: 98.4 (03 Oct 2020 05:00)  HR: 89 (03 Oct 2020 05:00) (75 - 89)  BP: 130/80 (03 Oct 2020 05:00) (117/64 - 135/65)  BP(mean): --  RR: 18 (03 Oct 2020 05:00) (16 - 18)  SpO2: 95% (03 Oct 2020 05:00) (85% - 100%)      PHYSICAL EXAM:    GENERAL: NAD  CHEST/LUNG: Clear to ausculation, bilaterally   HEART: RRR S1S2  ABDOMEN: non distended, +BS, soft, non tender, no guarding  GLUTEAL: Transgluteal drain with serosanguinous output 25cc/24hrs  EXTREMITIES:  calf soft, non tender b/l    I&O's Detail    02 Oct 2020 07:01  -  03 Oct 2020 07:00  --------------------------------------------------------  IN:    IV PiggyBack: 200 mL    sodium chloride 0.45%: 1500 mL  Total IN: 1700 mL    OUT:    Drain (mL): 25 mL  Total OUT: 25 mL    Total NET: 1675 mL      LABS:                        13.8   10.76 )-----------( 331      ( 03 Oct 2020 06:54 )             43.0     10-02    138  |  104  |  5<L>  ----------------------------<  168<H>  3.6   |  27  |  0.65    Ca    8.7      02 Oct 2020 04:25  Phos  2.4     10-02  Mg     2.3     10-02    TPro  8.4<H>  /  Alb  3.6  /  TBili  0.5  /  DBili  x   /  AST  33  /  ALT  86<H>  /  AlkPhos  81  10-01    PT/INR - ( 02 Oct 2020 04:25 )   PT: 13.7 sec;   INR: 1.19 ratio         PTT - ( 02 Oct 2020 04:25 )  PTT:31.3 sec  Urinalysis Basic - ( 01 Oct 2020 11:08 )    Color: Yellow / Appearance: Clear / S.010 / pH: x  Gluc: x / Ketone: Negative  / Bili: Negative / Urobili: Negative mg/dL   Blood: x / Protein: Negative mg/dL / Nitrite: Negative   Leuk Esterase: Negative / RBC: x / WBC x   Sq Epi: x / Non Sq Epi: x / Bacteria: x    Culture Results:   <10,000 CFU/mL Normal Urogenital Ethel (10-01 @ 15:02)        Assessment: 65F PMH HTN, DM, PSH GISELLE admitted with diverticular abscess, clinically stable.   8.5Fr drain transgluteal pelvic abscess (30 cc brown fluid, ?old hematoma) 10/2  +Flatus    Plan:  IR drain care. Monitor output.  Continue Zosyn per ID  IV hydration. NPO for now.  Follow up cultures  continue home DM and HTN medications   Will d/w attending

## 2020-10-03 NOTE — PROGRESS NOTE ADULT - SUBJECTIVE AND OBJECTIVE BOX
[facility]  Division of Infectious Diseases  [phone]    YISEL ANTHONY  [id number]    Follow up; Pelvic abscess    S/p IR drain placement, 30cc brownish fluid was removed. pain is better. No fever.  Culture has been sent, gram stain showing many PMS but culture so far neg.     PMH/PSH--  HTN (hypertension)  DM (diabetes mellitus)  H/O:   S/P GISELLE (total abdominal hysterectomy)    Allergies--  No Known Allergies    Medications--  Antibiotics: piperacillin/tazobactam IVPB.. 3.375 Gram(s) IV Intermittent every 8 hours    Social History--  EtOH: very rare  Tobacco: denies   Drug Use: denies     Family/Marital History--  FH: type 2 diabetes    FH: HTN (hypertension)    Travel/Environmental/Occupational History:  No travel.      Review of Systems:  A >=10-point review of systems was obtained.   Review of systems otherwise negative except as previously noted.    Physical Exam--  Vital Signs Last 24 Hrs  T(C): 37.4 (03 Oct 2020 12:00), Max: 37.4 (03 Oct 2020 12:00)  T(F): 99.3 (03 Oct 2020 12:00), Max: 99.3 (03 Oct 2020 12:00)  HR: 82 (03 Oct 2020 12:00) (75 - 89)  BP: 132/84 (03 Oct 2020 12:00) (117/64 - 135/65)  RR: 16 (03 Oct 2020 12:00) (16 - 18)  SpO2: 95% (03 Oct 2020 12:00) (85% - 100%)  General: Nontoxic-appearing Female in no acute distress.  HEENT: AT/NC. Anicteric. Conjunctiva pink and moist. Oropharynx clear.  Neck: Not rigid. No sense of mass.  Nodes: None palpable.  Lungs: Clear bilaterally without rales, wheezing or rhonchi  Heart: Regular rate and rhythm. No Murmur. No rub. No gallop. No palpable thrill.  Abdomen: Bowel sounds present and normoactive. Soft. Nondistended. Mildly tender LLQ, drain placed in gluteal area with some pus discharge. No mass. No organomegaly.   Back: No spinal tenderness. No costovertebral angle tenderness.   Extremities: No cyanosis or clubbing. No edema.   Skin: Warm. Dry. Good turgor. No rash. No vasculitic stigmata.  Psychiatric: Appropriate affect and mood for situation.       Labs:                        13.8   10.76 )-----------( 331      ( 03 Oct 2020 06:54 )             43.0      10-03    140  |  107  |  7   ----------------------------<  153<H>  3.6   |  25  |  0.86    Ca    9.0      03 Oct 2020 06:54  Phos  2.8     10-03  Mg     2.3     10-03    Culture - Body Fluid with Gram Stain (collected 10-02-20 @ 18:45)  Source: .Body Fluid PELVIC ABSCESS  Gram Stain (10-02-20 @ 23:39):    Numerous polymorphonuclear leukocytes per low power field    No organisms seen per oil power field    Culture - Urine (collected 10-01-20 @ 15:02)  Source: .Urine Clean Catch (Midstream)  Final Report (10-02-20 @ 11:26):    <10,000 CFU/mL Normal Urogenital Ethel    WBC Count: 10.76 K/uL (10-03-20 @ 06:54)  WBC Count: 10.37 K/uL (10-02-20 @ 04:25)  WBC Count: 10.76 K/uL (10-01-20 @ 09:10)    Creatinine, Serum: 0.86 mg/dL (10-03-20 @ 06:54)  Creatinine, Serum: 0.65 mg/dL (10-02-20 @ 04:25)  Creatinine, Serum: 0.87 mg/dL (10-01-20 @ 09:10)    COVID-19 PCR: NotDetec (10-01-20 @ 13:06)    < from: CT Abdomen and Pelvis w/ IV Cont (10.01.20 @ 11:17) >  EXAM:  CT ABDOMEN AND PELVIS IC                          PROCEDURE DATE:  10/01/2020    INTERPRETATION:  CLINICAL INDICATION: 64 years  Female with LLQ abd pain.  COMPARISON: None.  PROCEDURE:  CT of the Abdomen and Pelvis was performedwith intravenous contrast.  Intravenous contrast: 90 ml Omnipaque 350. 10 ml discarded.  Oral contrast: None.  Sagittal and coronal reformats were performed.  LIMITATION: Absence of enteric contrast limits evaluation of the GI tract.  FINDINGS:  LOWER CHEST: Mild right basilar scarring.  LIVER: Hepatic steatosis. Right lobe 19.1 cm sagittal.  BILE DUCTS: Normal caliber.  GALLBLADDER: Within normal limits.  SPLEEN: Within normal limits.  PANCREAS: Within normal limits.  ADRENALS: 6 mm right adrenal myelolipoma (2:40). Unremarkable left adrenal gland.  KIDNEYS/URETERS: 1.2 cm nonobstructing left lower pole calculus. No hydroureteronephrosis bilaterally. No perinephric edema.  BLADDER: Within normal limits.  REPRODUCTIVE ORGANS: Supracervical hysterectomy.  BOWEL: No bowel obstruction. 4.4 x 5.2 x 5.5 cm collection in the cul-de-sac (2:97), with surrounding fat stranding just inferior to a sigmoid diverticulum with surrounding fat stranding (2:94). Appendix is not visualized. No evidence of inflammation in the pericecal region. Mild retained fecal matter throughout the colon.  PERITONEUM: No ascites. Please see BOWEL findings.  VESSELS: Within normal limits.  RETROPERITONEUM/LYMPH NODES: No lymphadenopathy.  ABDOMINAL WALL: Postsurgical changes.  BONES: Thoracic degenerative changes. Grade 1 L4-5 anterolisthesis.  IMPRESSION:  4.4 x 5.2 x 5.5 cm collection in the cul-de-sac, likely diverticular abscess as above. Differential diagnosis includes cystic ovarian mass if patient has not had oophorectomy.  Nonobstructing left renal calculus.  Hepatomegaly and hepatic steatosis.  Findings were discussed with Dr. SUKHDEV SHAH [phone] 10/1/2020 11:34 AM by Dr. Bearden with read back confirmation.      Assessment and Recommendation:   Pelvic abscess, most likely diverticular in origin.   On adequate antibiotics  Patient states had BSO with GISELLE, though not clear why that would be done for fibroids.  No hx IBD  Due for colonoscopy, hx polyps 4y ago  No concern of fistulizing disease    Suggestions--  Continue zosyn   Serial abdominal exams  Drain care as per surgery/IR  Will follow Culture, many PMNs  Trend CBC, temp curve     Valerie Santo MD  Division of Infectious Diseases  [phone]

## 2020-10-04 LAB
ANION GAP SERPL CALC-SCNC: 7 MMOL/L — SIGNIFICANT CHANGE UP (ref 5–17)
BUN SERPL-MCNC: 5 MG/DL — LOW (ref 7–23)
CALCIUM SERPL-MCNC: 8.7 MG/DL — SIGNIFICANT CHANGE UP (ref 8.5–10.1)
CHLORIDE SERPL-SCNC: 105 MMOL/L — SIGNIFICANT CHANGE UP (ref 96–108)
CO2 SERPL-SCNC: 27 MMOL/L — SIGNIFICANT CHANGE UP (ref 22–31)
CREAT SERPL-MCNC: 0.6 MG/DL — SIGNIFICANT CHANGE UP (ref 0.5–1.3)
GLUCOSE BLDC GLUCOMTR-MCNC: 117 MG/DL — HIGH (ref 70–99)
GLUCOSE BLDC GLUCOMTR-MCNC: 141 MG/DL — HIGH (ref 70–99)
GLUCOSE BLDC GLUCOMTR-MCNC: 150 MG/DL — HIGH (ref 70–99)
GLUCOSE BLDC GLUCOMTR-MCNC: 164 MG/DL — HIGH (ref 70–99)
GLUCOSE SERPL-MCNC: 148 MG/DL — HIGH (ref 70–99)
HCT VFR BLD CALC: 41.6 % — SIGNIFICANT CHANGE UP (ref 34.5–45)
HGB BLD-MCNC: 13.1 G/DL — SIGNIFICANT CHANGE UP (ref 11.5–15.5)
MAGNESIUM SERPL-MCNC: 2.5 MG/DL — SIGNIFICANT CHANGE UP (ref 1.6–2.6)
MCHC RBC-ENTMCNC: 28 PG — SIGNIFICANT CHANGE UP (ref 27–34)
MCHC RBC-ENTMCNC: 31.5 GM/DL — LOW (ref 32–36)
MCV RBC AUTO: 88.9 FL — SIGNIFICANT CHANGE UP (ref 80–100)
NRBC # BLD: 0 /100 WBCS — SIGNIFICANT CHANGE UP (ref 0–0)
PHOSPHATE SERPL-MCNC: 2.7 MG/DL — SIGNIFICANT CHANGE UP (ref 2.5–4.5)
PLATELET # BLD AUTO: 309 K/UL — SIGNIFICANT CHANGE UP (ref 150–400)
POTASSIUM SERPL-MCNC: 3.4 MMOL/L — LOW (ref 3.5–5.3)
POTASSIUM SERPL-SCNC: 3.4 MMOL/L — LOW (ref 3.5–5.3)
RBC # BLD: 4.68 M/UL — SIGNIFICANT CHANGE UP (ref 3.8–5.2)
RBC # FLD: 12.7 % — SIGNIFICANT CHANGE UP (ref 10.3–14.5)
SODIUM SERPL-SCNC: 139 MMOL/L — SIGNIFICANT CHANGE UP (ref 135–145)
WBC # BLD: 8.91 K/UL — SIGNIFICANT CHANGE UP (ref 3.8–10.5)
WBC # FLD AUTO: 8.91 K/UL — SIGNIFICANT CHANGE UP (ref 3.8–10.5)

## 2020-10-04 PROCEDURE — 99232 SBSQ HOSP IP/OBS MODERATE 35: CPT

## 2020-10-04 RX ORDER — INSULIN LISPRO 100/ML
VIAL (ML) SUBCUTANEOUS
Refills: 0 | Status: DISCONTINUED | OUTPATIENT
Start: 2020-10-04 | End: 2020-10-06

## 2020-10-04 RX ORDER — INSULIN LISPRO 100/ML
VIAL (ML) SUBCUTANEOUS AT BEDTIME
Refills: 0 | Status: DISCONTINUED | OUTPATIENT
Start: 2020-10-04 | End: 2020-10-06

## 2020-10-04 RX ORDER — POTASSIUM CHLORIDE 20 MEQ
40 PACKET (EA) ORAL EVERY 4 HOURS
Refills: 0 | Status: COMPLETED | OUTPATIENT
Start: 2020-10-04 | End: 2020-10-04

## 2020-10-04 RX ADMIN — Medication 60 MILLIGRAM(S): at 06:07

## 2020-10-04 RX ADMIN — PIPERACILLIN AND TAZOBACTAM 25 GRAM(S): 4; .5 INJECTION, POWDER, LYOPHILIZED, FOR SOLUTION INTRAVENOUS at 13:49

## 2020-10-04 RX ADMIN — Medication 40 MILLIEQUIVALENT(S): at 13:49

## 2020-10-04 RX ADMIN — SODIUM CHLORIDE 50 MILLILITER(S): 9 INJECTION, SOLUTION INTRAVENOUS at 10:49

## 2020-10-04 RX ADMIN — PIPERACILLIN AND TAZOBACTAM 25 GRAM(S): 4; .5 INJECTION, POWDER, LYOPHILIZED, FOR SOLUTION INTRAVENOUS at 06:07

## 2020-10-04 RX ADMIN — Medication 40 MILLIEQUIVALENT(S): at 09:07

## 2020-10-04 RX ADMIN — Medication 1: at 11:17

## 2020-10-04 RX ADMIN — LISINOPRIL 40 MILLIGRAM(S): 2.5 TABLET ORAL at 06:07

## 2020-10-04 RX ADMIN — PIPERACILLIN AND TAZOBACTAM 25 GRAM(S): 4; .5 INJECTION, POWDER, LYOPHILIZED, FOR SOLUTION INTRAVENOUS at 21:21

## 2020-10-04 NOTE — PROGRESS NOTE ADULT - SUBJECTIVE AND OBJECTIVE BOX
[facility]  Division of Infectious Diseases  [phone]    YISEL ANTHONY  [id number]    Follow up; Pelvic abscess    pain is better. No fever.  Culture has been sent, gram stain showing many PMS but culture so far neg.     PMH/PSH--  HTN (hypertension)  DM (diabetes mellitus)  H/O:   S/P GISELLE (total abdominal hysterectomy)    Allergies--  No Known Allergies    Medications--  Antibiotics: piperacillin/tazobactam IVPB.. 3.375 Gram(s) IV Intermittent every 8 hours    Social History--  EtOH: very rare  Tobacco: denies   Drug Use: denies     Family/Marital History--  FH: type 2 diabetes    FH: HTN (hypertension)    Travel/Environmental/Occupational History:  No travel.      Review of Systems:  A >=10-point review of systems was obtained.   Review of systems otherwise negative except as previously noted.    Physical Exam--  Vital Signs Last 24 Hrs  T(C): 37.4 (03 Oct 2020 12:00), Max: 37.4 (03 Oct 2020 12:00)  T(F): 99.3 (03 Oct 2020 12:00), Max: 99.3 (03 Oct 2020 12:00)  HR: 82 (03 Oct 2020 12:00) (75 - 89)  BP: 132/84 (03 Oct 2020 12:00) (117/64 - 135/65)  RR: 16 (03 Oct 2020 12:00) (16 - 18)  SpO2: 95% (03 Oct 2020 12:00) (85% - 100%)  General: Nontoxic-appearing Female in no acute distress.  HEENT: AT/NC. Anicteric. Conjunctiva pink and moist. Oropharynx clear.  Neck: Not rigid. No sense of mass.  Nodes: None palpable.  Lungs: Clear bilaterally without rales, wheezing or rhonchi  Heart: Regular rate and rhythm. No Murmur. No rub. No gallop. No palpable thrill.  Abdomen: Bowel sounds present and normoactive. Soft. Nondistended. Mildly tender LLQ, drain placed in gluteal area with some pus discharge. No mass. No organomegaly.   Back: No spinal tenderness. No costovertebral angle tenderness.   Extremities: No cyanosis or clubbing. No edema.   Skin: Warm. Dry. Good turgor. No rash. No vasculitic stigmata.  Psychiatric: Appropriate affect and mood for situation.       Labs:                        13.8   10.76 )-----------( 331      ( 03 Oct 2020 06:54 )             43.0      10-03    140  |  107  |  7   ----------------------------<  153<H>  3.6   |  25  |  0.86    Ca    9.0      03 Oct 2020 06:54  Phos  2.8     10-03  Mg     2.3     10-03    Culture - Body Fluid with Gram Stain (collected 10-02-20 @ 18:45)  Source: .Body Fluid PELVIC ABSCESS  Gram Stain (10-02-20 @ 23:39):    Numerous polymorphonuclear leukocytes per low power field    No organisms seen per oil power field    Culture - Urine (collected 10-01-20 @ 15:02)  Source: .Urine Clean Catch (Midstream)  Final Report (10-02-20 @ 11:26):    <10,000 CFU/mL Normal Urogenital Ethel    WBC Count: 10.76 K/uL (10-03-20 @ 06:54)  WBC Count: 10.37 K/uL (10-02-20 @ 04:25)  WBC Count: 10.76 K/uL (10-01-20 @ 09:10)    Creatinine, Serum: 0.86 mg/dL (10-03-20 @ 06:54)  Creatinine, Serum: 0.65 mg/dL (10-02-20 @ 04:25)  Creatinine, Serum: 0.87 mg/dL (10-01-20 @ 09:10)    COVID-19 PCR: NotDetec (10-01-20 @ 13:06)    < from: CT Abdomen and Pelvis w/ IV Cont (10.01.20 @ 11:17) >  EXAM:  CT ABDOMEN AND PELVIS IC                          PROCEDURE DATE:  10/01/2020    INTERPRETATION:  CLINICAL INDICATION: 64 years  Female with LLQ abd pain.  COMPARISON: None.  PROCEDURE:  CT of the Abdomen and Pelvis was performedwith intravenous contrast.  Intravenous contrast: 90 ml Omnipaque 350. 10 ml discarded.  Oral contrast: None.  Sagittal and coronal reformats were performed.  LIMITATION: Absence of enteric contrast limits evaluation of the GI tract.  FINDINGS:  LOWER CHEST: Mild right basilar scarring.  LIVER: Hepatic steatosis. Right lobe 19.1 cm sagittal.  BILE DUCTS: Normal caliber.  GALLBLADDER: Within normal limits.  SPLEEN: Within normal limits.  PANCREAS: Within normal limits.  ADRENALS: 6 mm right adrenal myelolipoma (2:40). Unremarkable left adrenal gland.  KIDNEYS/URETERS: 1.2 cm nonobstructing left lower pole calculus. No hydroureteronephrosis bilaterally. No perinephric edema.  BLADDER: Within normal limits.  REPRODUCTIVE ORGANS: Supracervical hysterectomy.  BOWEL: No bowel obstruction. 4.4 x 5.2 x 5.5 cm collection in the cul-de-sac (2:97), with surrounding fat stranding just inferior to a sigmoid diverticulum with surrounding fat stranding (2:94). Appendix is not visualized. No evidence of inflammation in the pericecal region. Mild retained fecal matter throughout the colon.  PERITONEUM: No ascites. Please see BOWEL findings.  VESSELS: Within normal limits.  RETROPERITONEUM/LYMPH NODES: No lymphadenopathy.  ABDOMINAL WALL: Postsurgical changes.  BONES: Thoracic degenerative changes. Grade 1 L4-5 anterolisthesis.  IMPRESSION:  4.4 x 5.2 x 5.5 cm collection in the cul-de-sac, likely diverticular abscess as above. Differential diagnosis includes cystic ovarian mass if patient has not had oophorectomy.  Nonobstructing left renal calculus.  Hepatomegaly and hepatic steatosis.  Findings were discussed with Dr. SUKHDEV SHAH [phone] 10/1/2020 11:34 AM by Dr. Bearden with read back confirmation.      Assessment and Recommendation:   Pelvic abscess, most likely diverticular in origin.   On adequate antibiotics  Patient states had BSO with GISELLE, though not clear why that would be done for fibroids.  No hx IBD  Due for colonoscopy, hx polyps 4y ago  No concern of fistulizing disease  10/4: S/p IR drain placement 10/2, 30cc brownish fluid was removed. No pain, no fever. Very minimal bloody fluid in drain since 11pm last night.      Suggestions--  Continue zosyn   Serial abdominal exams  Drain care as per surgery/IR  Will follow Culture, many PMNs but culture so far neg  Trend CBC, temp curve both normal   Based on culture result will switch antibiotics    Dr. Pacheco will resume care on Monday.     Valerie Santo MD  Division of Infectious Diseases  [phone]

## 2020-10-04 NOTE — PROGRESS NOTE ADULT - SUBJECTIVE AND OBJECTIVE BOX
SURGERY PROGRESS HPI:  Pt seen and examined at bedside. LLQ pain is improving. Pt denies complaints. No acute events overnight. Tolerating clear liquid diet. Pt denies nausea and vomiting. Passed flatus overnight. No BM yet. Voiding well. Pt denies chest pain, SOB, dizziness, fever, chills. Ambulating.    Vital Signs Last 24 Hrs  T(C): 36.9 (04 Oct 2020 06:01), Max: 37.4 (03 Oct 2020 12:00)  T(F): 98.4 (04 Oct 2020 06:01), Max: 99.3 (03 Oct 2020 12:00)  HR: 87 (04 Oct 2020 06:01) (63 - 87)  BP: 126/85 (04 Oct 2020 06:01) (111/59 - 132/84)  BP(mean): --  RR: 18 (04 Oct 2020 06:01) (16 - 18)  SpO2: 98% (04 Oct 2020 06:01) (95% - 99%)      PHYSICAL EXAM:  GENERAL: NAD  CHEST/LUNG: Clear to ausculation, bilaterally   HEART: RRR S1S2  ABDOMEN: non distended, +BS, soft, non tender, no guarding  GLUTEAL: Transgluteal drain with serosanguinous output 10cc/24hrs  EXTREMITIES:  calf soft, non tender b/l    I&O's Detail    02 Oct 2020 07:01  -  03 Oct 2020 07:00  --------------------------------------------------------  IN:    IV PiggyBack: 200 mL    sodium chloride 0.45%: 1500 mL  Total IN: 1700 mL    OUT:    Drain (mL): 25 mL  Total OUT: 25 mL    Total NET: 1675 mL      03 Oct 2020 07:01  -  04 Oct 2020 06:47  --------------------------------------------------------  IN:  Total IN: 0 mL    OUT:    Drain (mL): 10 mL    Voided (mL): 600 mL  Total OUT: 610 mL    Total NET: -610 mL          LABS:                        13.8   10.76 )-----------( 331      ( 03 Oct 2020 06:54 )             43.0     10-03    140  |  107  |  7   ----------------------------<  153<H>  3.6   |  25  |  0.86    Ca    9.0      03 Oct 2020 06:54  Phos  2.8     10-03  Mg     2.3     10-03      Culture Results:   No growth (10-02 @ 18:45)  Culture Results:   <10,000 CFU/mL Normal Urogenital Ethel (10-01 @ 15:02)      Assessment: 65F PMH HTN, DM, PSH GISELLE admitted with diverticular abscess, clinically stable.   8.5Fr drain transgluteal pelvic abscess (30 cc brown fluid, ?old hematoma) 10/2  +Flatus    Plan:  IR drain care. Monitor output.  Continue Zosyn per ID  IV hydration. Continue clear liquid diet for now.  Follow up cultures  continue home DM and HTN medications   Will d/w attending

## 2020-10-04 NOTE — PROGRESS NOTE ADULT - ATTENDING COMMENTS
Patient continues to feel better daily. Tolerated clear liquids  Abdomen soft, non distended, with minimal discomfort in lower quadrants on deep palpation  Drain in place draining scant brown fluid    Advance to regular. Will change to PO antibiotics. Patient continues to feel better daily. Tolerated clear liquids  Abdomen soft, non distended, with minimal discomfort in lower quadrants on deep palpation  Drain in place draining scant brown fluid    Advance to regular. Will change to PO antibiotics pending finalized cultures. Appreciate ID guidance. Patient continues to feel better daily. Tolerated clear liquids  Abdomen soft, non distended, with minimal discomfort in lower quadrants on deep palpation  Drain in place draining scant brown fluid    Advance to regular. Start VTE chemoprophylaxis. Will change to PO antibiotics pending finalized cultures. Appreciate ID guidance.

## 2020-10-05 ENCOUNTER — TRANSCRIPTION ENCOUNTER (OUTPATIENT)
Age: 65
End: 2020-10-05

## 2020-10-05 LAB
ANION GAP SERPL CALC-SCNC: 6 MMOL/L — SIGNIFICANT CHANGE UP (ref 5–17)
BUN SERPL-MCNC: 5 MG/DL — LOW (ref 7–23)
CALCIUM SERPL-MCNC: 8.9 MG/DL — SIGNIFICANT CHANGE UP (ref 8.5–10.1)
CHLORIDE SERPL-SCNC: 104 MMOL/L — SIGNIFICANT CHANGE UP (ref 96–108)
CO2 SERPL-SCNC: 26 MMOL/L — SIGNIFICANT CHANGE UP (ref 22–31)
CREAT SERPL-MCNC: 0.7 MG/DL — SIGNIFICANT CHANGE UP (ref 0.5–1.3)
GLUCOSE BLDC GLUCOMTR-MCNC: 112 MG/DL — HIGH (ref 70–99)
GLUCOSE BLDC GLUCOMTR-MCNC: 133 MG/DL — HIGH (ref 70–99)
GLUCOSE BLDC GLUCOMTR-MCNC: 142 MG/DL — HIGH (ref 70–99)
GLUCOSE BLDC GLUCOMTR-MCNC: 151 MG/DL — HIGH (ref 70–99)
GLUCOSE SERPL-MCNC: 162 MG/DL — HIGH (ref 70–99)
HCT VFR BLD CALC: 41.6 % — SIGNIFICANT CHANGE UP (ref 34.5–45)
HGB BLD-MCNC: 13.3 G/DL — SIGNIFICANT CHANGE UP (ref 11.5–15.5)
MCHC RBC-ENTMCNC: 27.6 PG — SIGNIFICANT CHANGE UP (ref 27–34)
MCHC RBC-ENTMCNC: 32 GM/DL — SIGNIFICANT CHANGE UP (ref 32–36)
MCV RBC AUTO: 86.3 FL — SIGNIFICANT CHANGE UP (ref 80–100)
NRBC # BLD: 0 /100 WBCS — SIGNIFICANT CHANGE UP (ref 0–0)
PLATELET # BLD AUTO: 318 K/UL — SIGNIFICANT CHANGE UP (ref 150–400)
POTASSIUM SERPL-MCNC: 3.8 MMOL/L — SIGNIFICANT CHANGE UP (ref 3.5–5.3)
POTASSIUM SERPL-SCNC: 3.8 MMOL/L — SIGNIFICANT CHANGE UP (ref 3.5–5.3)
RBC # BLD: 4.82 M/UL — SIGNIFICANT CHANGE UP (ref 3.8–5.2)
RBC # FLD: 12.7 % — SIGNIFICANT CHANGE UP (ref 10.3–14.5)
SODIUM SERPL-SCNC: 136 MMOL/L — SIGNIFICANT CHANGE UP (ref 135–145)
WBC # BLD: 7.51 K/UL — SIGNIFICANT CHANGE UP (ref 3.8–10.5)
WBC # FLD AUTO: 7.51 K/UL — SIGNIFICANT CHANGE UP (ref 3.8–10.5)

## 2020-10-05 PROCEDURE — 99232 SBSQ HOSP IP/OBS MODERATE 35: CPT

## 2020-10-05 RX ORDER — ENOXAPARIN SODIUM 100 MG/ML
40 INJECTION SUBCUTANEOUS DAILY
Refills: 0 | Status: DISCONTINUED | OUTPATIENT
Start: 2020-10-05 | End: 2020-10-06

## 2020-10-05 RX ADMIN — PIPERACILLIN AND TAZOBACTAM 25 GRAM(S): 4; .5 INJECTION, POWDER, LYOPHILIZED, FOR SOLUTION INTRAVENOUS at 21:57

## 2020-10-05 RX ADMIN — ENOXAPARIN SODIUM 40 MILLIGRAM(S): 100 INJECTION SUBCUTANEOUS at 11:50

## 2020-10-05 RX ADMIN — PIPERACILLIN AND TAZOBACTAM 25 GRAM(S): 4; .5 INJECTION, POWDER, LYOPHILIZED, FOR SOLUTION INTRAVENOUS at 06:48

## 2020-10-05 RX ADMIN — LISINOPRIL 40 MILLIGRAM(S): 2.5 TABLET ORAL at 06:46

## 2020-10-05 RX ADMIN — Medication 60 MILLIGRAM(S): at 06:46

## 2020-10-05 RX ADMIN — PIPERACILLIN AND TAZOBACTAM 25 GRAM(S): 4; .5 INJECTION, POWDER, LYOPHILIZED, FOR SOLUTION INTRAVENOUS at 14:52

## 2020-10-05 RX ADMIN — Medication 1: at 07:56

## 2020-10-05 NOTE — PROGRESS NOTE ADULT - ASSESSMENT
Pelvic abscess, most likely diverticular in origin.   On adequate antibiotics  Patient states had BSO with GISELLE, though not clear why that would be done for fibroids.  No hx IBD  Due for colonoscopy, hx polyps 4y ago  No concern of fistulizing disease  10/4: S/p IR drain placement 10/2, 30cc brownish fluid was removed. No pain, no fever. Very minimal bloody fluid in drain since 11pm last night.    10/5: Overall stable. No concern of uncontrolled infection on exam      Suggestions--  Continue zosyn, if cx remain negative likely transition to augmentin 875mg PO Q12H x5 days in am  Serial abdominal exams  Drain care as per surgery/IR    Hernesto Pacheco MD  Attending Physician  [facility]  Division of Infectious Diseases  [phone]

## 2020-10-05 NOTE — DISCHARGE NOTE PROVIDER - CARE PROVIDER_API CALL
Ketan Martinez (MD)  Surgery  Critical Care  [address], [address] [place] [address]  Phone: [phone]  Fax: [phone]  Follow Up Time:

## 2020-10-05 NOTE — DISCHARGE NOTE PROVIDER - NSDCCPCAREPLAN_GEN_ALL_CORE_FT
PRINCIPAL DISCHARGE DIAGNOSIS  Diagnosis: Colonic diverticular abscess  Assessment and Plan of Treatment: Follow up in 7-10 days. Please call the office to make an appointment. Activity as tolerated. Rest as needed. You may eat a low residue diet. Do not lift anything heavier than 10 pounds. You may take over the counter stool softeners as needed. Call for any fever over 101, nausea, vomiting, severe pain, no passing of gas or bowel movement. You may shower and pat dry.

## 2020-10-05 NOTE — PROGRESS NOTE ADULT - ATTENDING COMMENTS
Continues to clinically do well. Tolerating regular diet. No pain  Appreciate ID recommendations.  Dispo home tomorrow with drain care and PO antibiotics to finish course.

## 2020-10-05 NOTE — DISCHARGE NOTE PROVIDER - NSDCMRMEDTOKEN_GEN_ALL_CORE_FT
Centrum Women&#x27;s oral tablet: 1 tab(s) orally once a day  metFORMIN 500 mg oral tablet: 1 tab(s) orally 2 times a day with meals  Nifedical XL 60 mg oral tablet, extended release: 1 tab(s) orally once a day  quinapril 40 mg oral tablet: 1 tab(s) orally once a day   amoxicillin-clavulanate 875 mg-125 mg oral tablet: 1 tab(s) orally every 12 hours   Centrum Women&#x27;s oral tablet: 1 tab(s) orally once a day  metFORMIN 500 mg oral tablet: 1 tab(s) orally 2 times a day with meals  Nifedical XL 60 mg oral tablet, extended release: 1 tab(s) orally once a day  quinapril 40 mg oral tablet: 1 tab(s) orally once a day

## 2020-10-05 NOTE — PROGRESS NOTE ADULT - SUBJECTIVE AND OBJECTIVE BOX
Patient seen and examined bedside resting comfortably.  No complaints offered. Reports flatus/BM.   Denies abdominal pain, nausea and vomiting. Tolerating diet.  Denies chest pain, dyspnea, cough.  Ambulating. Afebrile.    T(F): 98.1 (10-05-20 @ 06:19), Max: 98.8 (10-04-20 @ 17:03)  HR: 72 (10-05-20 @ 06:19) (64 - 80)  BP: 137/82 (10-05-20 @ 06:19) (112/61 - 137/82)  RR: 18 (10-05-20 @ 06:19) (18 - 18)  SpO2: 100% (10-05-20 @ 06:19) (99% - 100%)      POCT Blood Glucose.: 117 mg/dL (04 Oct 2020 21:12)  POCT Blood Glucose.: 141 mg/dL (04 Oct 2020 16:17)  POCT Blood Glucose.: 164 mg/dL (04 Oct 2020 11:11)  POCT Blood Glucose.: 150 mg/dL (04 Oct 2020 07:46)      PHYSICAL EXAM:  General: NAD, WDWN  Neuro:  Alert & oriented x 3  HEENT: NCAT, EOMI, conjunctiva clear  CV: +S1+S2 regular rate and rhythm  Lung: clear to auscultation bilaterally, respirations nonlabored, good inspiratory effort  Abdomen: soft, NTND. Normoactive BS. Left transgluteal drain with scant s/s output, 2.5cc recorded  Extremities: no pedal edema or calf tenderness noted     LABS:                        13.1   8.91  )-----------( 309      ( 04 Oct 2020 07:25 )             41.6     10-04    139  |  105  |  5<L>  ----------------------------<  148<H>  3.4<L>   |  27  |  0.60    Ca    8.7      04 Oct 2020 07:25  Phos  2.7     10-04  Mg     2.5     10-04      Culture Results:   No growth (10-02 @ 18:45)  Culture Results:   <10,000 CFU/mL Normal Urogenital Ethel (10-01 @ 15:02)      Assessment: 65F PMH HTN, DM, PSH GISELLE admitted with diverticular abscess, s/p IR drainage of pelvic abscess 10/2, clinically stable.   Plan:  IR drain care. Monitor output.  Continue Zosyn per ID, Follow up appreciated. Awaiting final results for discharge planning, d/w patient.  Continue regular diet  continue home DM and HTN medications

## 2020-10-05 NOTE — DISCHARGE NOTE PROVIDER - HOSPITAL COURSE
64 y/o female PMHx HTN, DM, PSHx GISELLE admitted with diverticular abscess s/p IR drainage of pelvic abscess 10/2/20. IV Zosyn given and ID was consulted. Patient's pain improved, bowel function returned, and patient tolerated advancement of diet.  At the time of discharge on 10/6, the patient was hemodynamically stable, was tolerating PO diet, was voiding urine, was ambulating, and was comfortable without pain. Patient instructed to follow up and to call the office to make an appointment. 66 y/o female PMHx HTN, DM, PSHx GISELLE admitted with diverticular abscess s/p IR drainage of pelvic abscess 10/2/20. IV Zosyn given and ID was consulted. Patient's pain improved, bowel function returned, and patient tolerated advancement of diet.  At the time of discharge on 10/6, the patient was hemodynamically stable, was tolerating PO diet, was voiding urine, was ambulating, and was comfortable without pain. Patient instructed to follow up and to call the office to make an appointment.

## 2020-10-05 NOTE — PROGRESS NOTE ADULT - SUBJECTIVE AND OBJECTIVE BOX
[facility]  Division of Infectious Diseases  [phone]    Name: YISEL ANTHONY  Age: 64y  Gender: Female  MRN: [id number]    Interval History--  Notes reviewed. Feeling ok. Had a BM, feels relieved. Pain not an issue. Tolerating diet without difficulty. No fevers, chills, or rigors. Has questions re: diet going forward.    Past Medical History--  HTN (hypertension)    DM (diabetes mellitus)    H/O:     S/P GISELLE (total abdominal hysterectomy)        For details regarding the patient's social history, family history, and other miscellaneous elements, please refer the initial infectious diseases consultation and/or the admitting history and physical examination for this admission.    Allergies    No Known Allergies    Intolerances        Medications--  Antibiotics:  piperacillin/tazobactam IVPB.. 3.375 Gram(s) IV Intermittent every 8 hours    Immunologic:  influenza   Vaccine 0.5 milliLiter(s) IntraMuscular once    Other:  dextrose 40% Gel PRN  dextrose 5%.  dextrose 50% Injectable  dextrose 50% Injectable  dextrose 50% Injectable  enoxaparin Injectable  glucagon  Injectable PRN  insulin lispro (HumaLOG) corrective regimen sliding scale  insulin lispro (HumaLOG) corrective regimen sliding scale  lisinopril  morphine  - Injectable PRN  NIFEdipine XL  ondansetron Injectable PRN  sodium chloride 0.45%.      Review of Systems--  A 10-point review of systems was obtained.   Review of systems otherwise negative except as previously noted.    Physical Examination--  Vital Signs: T(F): 97.9 (10-05-20 @ 11:30), Max: 98.8 (10-04-20 @ 17:03)  HR: 67 (10-05-20 @ 11:30)  BP: 115/68 (10-05-20 @ 11:30)  RR: 19 (10-05-20 @ 11:30)  SpO2: 97% (10-05-20 @ 11:30)  Wt(kg): --  General: Nontoxic-appearing Female in no acute distress.  HEENT: AT/NC. Anicteric. Conjunctiva pink and moist. Oropharynx clear.  Neck: Not rigid. No sense of mass.  Nodes: None palpable.  Lungs: Clear bilaterally without rales, wheezing or rhonchi  Heart: Regular rate and rhythm. No Murmur. No rub. No gallop. No palpable thrill.  Abdomen: Bowel sounds present and normoactive. Soft. Nondistended. Nontender. Drain- bloody. No sense of mass. No organomegaly. Obese.   Back: No spinal tenderness. No costovertebral angle tenderness.   Extremities: No cyanosis or clubbing. No edema.   Skin: Warm. Dry. Good turgor. No rash. No vasculitic stigmata.  Psychiatric: Appropriate affect and mood for situation.         Laboratory Studies--  CBC                        13.3   7.51  )-----------( 318      ( 05 Oct 2020 07:47 )             41.6     WBC trend  WBC Count: 7.51 K/uL (10-05-20 @ 07:47)  WBC Count: 8.91 K/uL (10-04-20 @ 07:25)  WBC Count: 10.76 K/uL (10-03-20 @ 06:54)  WBC Count: 10.37 K/uL (10-02-20 @ 04:25)  WBC Count: 10.76 K/uL (10-01-20 @ 09:10)      Chemistries  10-05    136  |  104  |  5<L>  ----------------------------<  162<H>  3.8   |  26  |  0.70    Ca    8.9      05 Oct 2020 07:47  Phos  2.7     10-04  Mg     2.5     10-04        Culture Data    Culture - Body Fluid with Gram Stain (collected 02 Oct 2020 18:45)  Source: .Body Fluid PELVIC ABSCESS  Gram Stain (prelim) (03 Oct 2020 18:05):    Numerous polymorphonuclear leukocytes per low power field    No organisms seen per oil power field  Preliminary Report (03 Oct 2020 18:05):    No growth    Culture - Urine (collected 01 Oct 2020 15:02)  Source: .Urine Clean Catch (Midstream)  Final Report (02 Oct 2020 11:26):    <10,000 CFU/mL Normal Urogenital Ethel

## 2020-10-05 NOTE — MEDICAL STUDENT PROGRESS NOTE(EDUCATION) - SUBJECTIVE AND OBJECTIVE BOX
GENERAL SURGERY PROGRESS NOTE    Patient: YISEL ANTHONY , 64y ([birth year])Female   MRN: [id number]  Location: [facility] [address]  Visit: 10-01-20 Inpatient  Date: 10-05-20 @ 06:22    Hospital Day #:  Post-Op Day #:    Events of past 24 hours:    PAST MEDICAL & SURGICAL HISTORY:  HTN (hypertension)    DM (diabetes mellitus)    H/O:     S/P GISELLE (total abdominal hysterectomy)        Vitals: T(F): 98.1 (10-04-20 @ 23:10), Max: 98.8 (10-04-20 @ 17:03)  HR: 64 (10-04-20 @ 23:10)  BP: 112/61 (10-04-20 @ 23:10)  BP(mean): --  RR: 18 (10-04-20 @ 23:10)  SpO2: 99% (10-04-20 @ 23:10)      Diet, Consistent Carbohydrate/No Snacks:   Fiber/Residue Restricted      10-03-20 @ 07:01  -  10-04-20 @ 07:00  --------------------------------------------------------  IN:  Total IN: 0 mL    OUT:    Drain (mL): 10 mL    Voided (mL): 600 mL  Total OUT: 610 mL    Total NET: -610 mL      10-04-20 @ 07:01  -  10-05-20 @ 06:22  --------------------------------------------------------  IN:    IV PiggyBack: 200 mL    sodium chloride 0.45%: 750 mL  Total IN: 950 mL    OUT:    Drain (mL): 2.5 mL    Voided (mL): 800 mL  Total OUT: 802.5 mL    Total NET: 147.5 mL        Bowel Movement:  Flatus:     PHYSICAL EXAM  GEN:  CV:  LUNGS:  ABD:  EXT:  WOUND:  INCISION:    MEDICATIONS  (STANDING):  dextrose 5%. 1000 milliLiter(s) (50 mL/Hr) IV Continuous <Continuous>  dextrose 50% Injectable 12.5 Gram(s) IV Push once  dextrose 50% Injectable 25 Gram(s) IV Push once  dextrose 50% Injectable 25 Gram(s) IV Push once  influenza   Vaccine 0.5 milliLiter(s) IntraMuscular once  insulin lispro (HumaLOG) corrective regimen sliding scale   SubCutaneous three times a day before meals  insulin lispro (HumaLOG) corrective regimen sliding scale   SubCutaneous at bedtime  lisinopril 40 milliGRAM(s) Oral daily  NIFEdipine XL 60 milliGRAM(s) Oral daily  piperacillin/tazobactam IVPB.. 3.375 Gram(s) IV Intermittent every 8 hours  sodium chloride 0.45%. 1000 milliLiter(s) (50 mL/Hr) IV Continuous <Continuous>    MEDICATIONS  (PRN):  dextrose 40% Gel 15 Gram(s) Oral once PRN Blood Glucose LESS THAN 70 milliGRAM(s)/deciliter  glucagon  Injectable 1 milliGRAM(s) IntraMuscular once PRN Glucose LESS THAN 70 milligrams/deciliter  morphine  - Injectable 2 milliGRAM(s) IV Push every 6 hours PRN Moderate Pain (4 - 6)  ondansetron Injectable 4 milliGRAM(s) IV Push every 6 hours PRN Nausea and/or Vomiting      DVT PROPHYLAXIS: [] YES [] NO   GI PROPHYLAXIS: [] YES [] NO   ANTICOAGULATION: [] YES [] NO   ANTIBIOTICS: [] YES [] NO piperacillin/tazobactam IVPB.. 3.375 Gram(s)        LAB/STUDIES:  CAPILLARY BLOOD GLUCOSE      POCT Blood Glucose.: 117 mg/dL (04 Oct 2020 21:12)  POCT Blood Glucose.: 141 mg/dL (04 Oct 2020 16:17)  POCT Blood Glucose.: 164 mg/dL (04 Oct 2020 11:11)  POCT Blood Glucose.: 150 mg/dL (04 Oct 2020 07:46)                          13.1   8.91  )-----------( 309      ( 04 Oct 2020 07:25 )             41.6     10-    139  |  105  |  5<L>  ----------------------------<  148<H>  3.4<L>   |  27  |  0.60    Ca    8.7      04 Oct 2020 07:25  Phos  2.7     10-04  Mg     2.5     10-04                    Culture - Body Fluid with Gram Stain (collected 02 Oct 2020 18:45)  Source: .Body Fluid PELVIC ABSCESS  Gram Stain (prelim) (03 Oct 2020 18:05):    Numerous polymorphonuclear leukocytes per low power field    No organisms seen per oil power field  Preliminary Report (03 Oct 2020 18:05):    No growth      IMAGING:    Assessment:  64yFemale patient admitted with .    Plan:      Date/Time: 10-05-20 @ 06:22   GENERAL SURGERY PROGRESS NOTE    Patient: YSIEL ANTHONY , 64y ([birth year])Female   MRN: [id number]  Location: [facility] [address]  Visit: 10-01-20 Inpatient  Date: 10-05-20 @ 06:22    Hospital Day #:4    Events of past 24 hours: Overnight, no events. + Bm and flatus. No pain, n/v/d. No fever or chills. Tolerating diet. Reports small amount of drainage from IR drain.     PAST MEDICAL & SURGICAL HISTORY:  HTN (hypertension)    DM (diabetes mellitus)    H/O:     S/P GISELLE (total abdominal hysterectomy)        Vitals: T(F): 98.1 (10-04-20 @ 23:10), Max: 98.8 (10-04-20 @ 17:03)  HR: 64 (10-04-20 @ 23:10)  BP: 112/61 (10-04-20 @ 23:10)  BP(mean): --  RR: 18 (10-04-20 @ 23:10)  SpO2: 99% (10-04-20 @ 23:10)      Diet, Consistent Carbohydrate/No Snacks:   Fiber/Residue Restricted      10-03-20 @ 07:01  -  10-04-20 @ 07:00  --------------------------------------------------------  IN:  Total IN: 0 mL    OUT:    Drain (mL): 10 mL    Voided (mL): 600 mL  Total OUT: 610 mL    Total NET: -610 mL      10-04-20 @ 07:01  -  10-05-20 @ 06:22  --------------------------------------------------------  IN:    IV PiggyBack: 200 mL    sodium chloride 0.45%: 750 mL  Total IN: 950 mL    OUT:    Drain (mL): 2.5 mL    Voided (mL): 800 mL  Total OUT: 802.5 mL    Total NET: 147.5 mL    PHYSICAL EXAM  GEN: obese female in NAD  CV: RRR  LUNGS: nonlabored breathing  ABD: soft obese nontender nondistended, bowel sounds present  EXT: FROM, no edema  drain: Left buttock, site is CDI no signs of infection, draining 2 ml of pus into collection bag    MEDICATIONS  (STANDING):  dextrose 5%. 1000 milliLiter(s) (50 mL/Hr) IV Continuous <Continuous>  dextrose 50% Injectable 12.5 Gram(s) IV Push once  dextrose 50% Injectable 25 Gram(s) IV Push once  dextrose 50% Injectable 25 Gram(s) IV Push once  influenza   Vaccine 0.5 milliLiter(s) IntraMuscular once  insulin lispro (HumaLOG) corrective regimen sliding scale   SubCutaneous three times a day before meals  insulin lispro (HumaLOG) corrective regimen sliding scale   SubCutaneous at bedtime  lisinopril 40 milliGRAM(s) Oral daily  NIFEdipine XL 60 milliGRAM(s) Oral daily  piperacillin/tazobactam IVPB.. 3.375 Gram(s) IV Intermittent every 8 hours  sodium chloride 0.45%. 1000 milliLiter(s) (50 mL/Hr) IV Continuous <Continuous>    MEDICATIONS  (PRN):  dextrose 40% Gel 15 Gram(s) Oral once PRN Blood Glucose LESS THAN 70 milliGRAM(s)/deciliter  glucagon  Injectable 1 milliGRAM(s) IntraMuscular once PRN Glucose LESS THAN 70 milligrams/deciliter  morphine  - Injectable 2 milliGRAM(s) IV Push every 6 hours PRN Moderate Pain (4 - 6)  ondansetron Injectable 4 milliGRAM(s) IV Push every 6 hours PRN Nausea and/or Vomiting      DVT PROPHYLAXIS: [] YES [] NO   GI PROPHYLAXIS: [] YES [] NO   ANTICOAGULATION: [] YES [] NO   ANTIBIOTICS: [] YES [] NO piperacillin/tazobactam IVPB.. 3.375 Gram(s)        LAB/STUDIES:  CAPILLARY BLOOD GLUCOSE      POCT Blood Glucose.: 117 mg/dL (04 Oct 2020 21:12)  POCT Blood Glucose.: 141 mg/dL (04 Oct 2020 16:17)  POCT Blood Glucose.: 164 mg/dL (04 Oct 2020 11:11)  POCT Blood Glucose.: 150 mg/dL (04 Oct 2020 07:46)                          13.1   8.91  )-----------( 309      ( 04 Oct 2020 07:25 )             41.6     10-04    139  |  105  |  5<L>  ----------------------------<  148<H>  3.4<L>   |  27  |  0.60    Ca    8.7      04 Oct 2020 07:25  Phos  2.7     10-04  Mg     2.5     10-04                    Culture - Body Fluid with Gram Stain (collected 02 Oct 2020 18:45)  Source: .Body Fluid PELVIC ABSCESS  Gram Stain (prelim) (03 Oct 2020 18:05):    Numerous polymorphonuclear leukocytes per low power field    No organisms seen per oil power field  Preliminary Report (03 Oct 2020 18:05):    No growth      IMAGING:    Assessment:  64yFemale patient admitted with diverticular abscess s/p IR drainage of pelvic abscess 10/2, clinically stable.     Plan:  IR drain care. Monitor output.  Continue Zosyn per ID, Awaiting final results for discharge planning and antibiotic selection, d/w patient.  Continue regular diet  continue home DM and HTN medications     Date/Time: 10-05-20 @ 06:22

## 2020-10-06 ENCOUNTER — TRANSCRIPTION ENCOUNTER (OUTPATIENT)
Age: 65
End: 2020-10-06

## 2020-10-06 VITALS
TEMPERATURE: 98 F | DIASTOLIC BLOOD PRESSURE: 72 MMHG | HEART RATE: 60 BPM | SYSTOLIC BLOOD PRESSURE: 153 MMHG | OXYGEN SATURATION: 98 % | RESPIRATION RATE: 18 BRPM

## 2020-10-06 LAB
ANION GAP SERPL CALC-SCNC: 7 MMOL/L — SIGNIFICANT CHANGE UP (ref 5–17)
BUN SERPL-MCNC: 8 MG/DL — SIGNIFICANT CHANGE UP (ref 7–23)
CALCIUM SERPL-MCNC: 8.9 MG/DL — SIGNIFICANT CHANGE UP (ref 8.5–10.1)
CHLORIDE SERPL-SCNC: 105 MMOL/L — SIGNIFICANT CHANGE UP (ref 96–108)
CO2 SERPL-SCNC: 27 MMOL/L — SIGNIFICANT CHANGE UP (ref 22–31)
CREAT SERPL-MCNC: 0.65 MG/DL — SIGNIFICANT CHANGE UP (ref 0.5–1.3)
GLUCOSE BLDC GLUCOMTR-MCNC: 105 MG/DL — HIGH (ref 70–99)
GLUCOSE BLDC GLUCOMTR-MCNC: 134 MG/DL — HIGH (ref 70–99)
GLUCOSE BLDC GLUCOMTR-MCNC: 155 MG/DL — HIGH (ref 70–99)
GLUCOSE SERPL-MCNC: 142 MG/DL — HIGH (ref 70–99)
HCT VFR BLD CALC: 41.1 % — SIGNIFICANT CHANGE UP (ref 34.5–45)
HGB BLD-MCNC: 12.8 G/DL — SIGNIFICANT CHANGE UP (ref 11.5–15.5)
MCHC RBC-ENTMCNC: 27.6 PG — SIGNIFICANT CHANGE UP (ref 27–34)
MCHC RBC-ENTMCNC: 31.1 GM/DL — LOW (ref 32–36)
MCV RBC AUTO: 88.8 FL — SIGNIFICANT CHANGE UP (ref 80–100)
NRBC # BLD: 0 /100 WBCS — SIGNIFICANT CHANGE UP (ref 0–0)
PLATELET # BLD AUTO: 350 K/UL — SIGNIFICANT CHANGE UP (ref 150–400)
POTASSIUM SERPL-MCNC: 3.7 MMOL/L — SIGNIFICANT CHANGE UP (ref 3.5–5.3)
POTASSIUM SERPL-SCNC: 3.7 MMOL/L — SIGNIFICANT CHANGE UP (ref 3.5–5.3)
RBC # BLD: 4.63 M/UL — SIGNIFICANT CHANGE UP (ref 3.8–5.2)
RBC # FLD: 12.5 % — SIGNIFICANT CHANGE UP (ref 10.3–14.5)
SODIUM SERPL-SCNC: 139 MMOL/L — SIGNIFICANT CHANGE UP (ref 135–145)
WBC # BLD: 6.93 K/UL — SIGNIFICANT CHANGE UP (ref 3.8–10.5)
WBC # FLD AUTO: 6.93 K/UL — SIGNIFICANT CHANGE UP (ref 3.8–10.5)

## 2020-10-06 PROCEDURE — 99232 SBSQ HOSP IP/OBS MODERATE 35: CPT

## 2020-10-06 RX ADMIN — PIPERACILLIN AND TAZOBACTAM 25 GRAM(S): 4; .5 INJECTION, POWDER, LYOPHILIZED, FOR SOLUTION INTRAVENOUS at 05:35

## 2020-10-06 RX ADMIN — LISINOPRIL 40 MILLIGRAM(S): 2.5 TABLET ORAL at 05:34

## 2020-10-06 RX ADMIN — Medication 1: at 08:13

## 2020-10-06 RX ADMIN — PIPERACILLIN AND TAZOBACTAM 25 GRAM(S): 4; .5 INJECTION, POWDER, LYOPHILIZED, FOR SOLUTION INTRAVENOUS at 14:30

## 2020-10-06 RX ADMIN — ENOXAPARIN SODIUM 40 MILLIGRAM(S): 100 INJECTION SUBCUTANEOUS at 12:56

## 2020-10-06 NOTE — PROGRESS NOTE ADULT - SUBJECTIVE AND OBJECTIVE BOX
[facility]  Division of Infectious Diseases  [phone]    Name: YISEL ANTHONY  Age: 64y  Gender: Female  MRN: [id number]    Interval History--  Notes reviewed. Feeling fine. Tolerating PO without difficulty. Pain not an issue.    Past Medical History--  HTN (hypertension)  DM (diabetes mellitus)  H/O:   S/P GISELLE (total abdominal hysterectomy)      For details regarding the patient's social history, family history, and other miscellaneous elements, please refer the initial infectious diseases consultation and/or the admitting history and physical examination for this admission.    Allergies    No Known Allergies    Intolerances        Medications--  Antibiotics:  piperacillin/tazobactam IVPB.. 3.375 Gram(s) IV Intermittent every 8 hours    Immunologic:  influenza   Vaccine 0.5 milliLiter(s) IntraMuscular once    Other:  dextrose 40% Gel PRN  dextrose 5%.  dextrose 50% Injectable  dextrose 50% Injectable  dextrose 50% Injectable  enoxaparin Injectable  glucagon  Injectable PRN  insulin lispro (HumaLOG) corrective regimen sliding scale  insulin lispro (HumaLOG) corrective regimen sliding scale  lisinopril  morphine  - Injectable PRN  NIFEdipine XL  ondansetron Injectable PRN  sodium chloride 0.45%.      Review of Systems--  A 10-point review of systems was obtained.   Review of systems otherwise unchanged compared to prior visit except as previously noted.    Physical Examination--  Vital Signs: T(F): 98.3 (10-06-20 @ 11:09), Max: 98.3 (10-05-20 @ 23:21)  HR: 70 (10-06-20 @ 11:09)  BP: 123/83 (10-06-20 @ 11:09)  RR: 16 (10-06-20 @ 11:09)  SpO2: 99% (10-06-20 @ 11:09)  Wt(kg): --  General: Nontoxic-appearing Female in no acute distress.  HEENT: AT/NC. Anicteric. Conjunctiva pink and moist. Oropharynx clear.  Neck: Not rigid. No sense of mass.  Nodes: None palpable.  Lungs: Clear bilaterally without rales, wheezing or rhonchi  Heart: Regular rate and rhythm. No Murmur. No rub. No gallop. No palpable thrill.  Abdomen: Bowel sounds present and normoactive. Soft. Nondistended. Nontender. Drain- bloody. No sense of mass. No organomegaly. Obese.   Back: No spinal tenderness. No costovertebral angle tenderness.  Drain scant. L gluteal site C/D/I, NT  Extremities: No cyanosis or clubbing. No edema.   Skin: Warm. Dry. Good turgor. No rash. No vasculitic stigmata.  Psychiatric: Appropriate affect and mood for situation.         Laboratory Studies--  CBC                        12.8   6.93  )-----------( 350      ( 06 Oct 2020 06:32 )             41.1       Chemistries  10-06    139  |  105  |  8   ----------------------------<  142<H>  3.7   |  27  |  0.65    Ca    8.9      06 Oct 2020 06:32        Culture Data    Culture - Body Fluid with Gram Stain (collected 02 Oct 2020 18:45)  Source: .Body Fluid PELVIC ABSCESS  Gram Stain (prelim) (03 Oct 2020 18:05):    Numerous polymorphonuclear leukocytes per low power field    No organisms seen per oil power field  Preliminary Report (03 Oct 2020 18:05):    No growth    Culture - Urine (collected 01 Oct 2020 15:02)  Source: .Urine Clean Catch (Midstream)  Final Report (02 Oct 2020 11:26):    <10,000 CFU/mL Normal Urogenital Ethel

## 2020-10-06 NOTE — PROGRESS NOTE ADULT - PROVIDER SPECIALTY LIST ADULT
Infectious Disease
Surgery
Infectious Disease

## 2020-10-06 NOTE — DISCHARGE NOTE NURSING/CASE MANAGEMENT/SOCIAL WORK - PATIENT PORTAL LINK FT
You can access the FollowMyHealth Patient Portal offered by [facility] by registering at the following website: [email or web address]. By joining [facility]’s FollowMyHealth portal, you will also be able to view your health information using other applications (apps) compatible with our system.

## 2020-10-06 NOTE — PROGRESS NOTE ADULT - REASON FOR ADMISSION
Abdominal pain found to have intraabdominal abscess

## 2020-10-06 NOTE — CHART NOTE - NSCHARTNOTEFT_GEN_A_CORE
Date 10/06/2020    [address] [address]      To Whom It May Concern,     Alice Mendoza was admitted on 10/01/2020, treated and discharged on 10/06/2020 from [facility].   Patient should not return to work until after follow up with surgeon and radiologist.     If any questions or concerns please call.     Thanks,     Ros Pressley   Surgical Physician Assistant   [phone]  Beeper # [id number]

## 2020-10-06 NOTE — PROGRESS NOTE ADULT - SUBJECTIVE AND OBJECTIVE BOX
SURGERY PROGRESS HPI:  Patient seen and examined bedside resting comfortably.  No complaints offered, no events overnight. Reports 3 BMs yesterday, +flatus. Voiding well. Tolerating regular diet.  Denies abdominal pain, nausea, vomiting, chest pain, dyspnea, cough.  Ambulating. Afebrile.    Vital Signs Last 24 Hrs  T(C): 36.8 (06 Oct 2020 05:05), Max: 36.8 (05 Oct 2020 17:36)  T(F): 98.2 (06 Oct 2020 05:05), Max: 98.3 (05 Oct 2020 23:21)  HR: 60 (06 Oct 2020 05:05) (60 - 67)  BP: 148/69 (06 Oct 2020 05:05) (115/68 - 148/69)  BP(mean): --  RR: 18 (06 Oct 2020 05:05) (18 - 19)  SpO2: 96% (06 Oct 2020 05:05) (96% - 99%)    CAPILLARY BLOOD GLUCOSE  POCT Blood Glucose.: 142 mg/dL (05 Oct 2020 22:12)  POCT Blood Glucose.: 112 mg/dL (05 Oct 2020 16:36)  POCT Blood Glucose.: 133 mg/dL (05 Oct 2020 11:50)  POCT Blood Glucose.: 151 mg/dL (05 Oct 2020 07:45)    PHYSICAL EXAM:  General: NAD, WDWN  Neuro:  Alert & oriented x 3  HEENT: NCAT, EOMI, conjunctiva clear  CV: +S1+S2 regular rate and rhythm  Lung: clear to auscultation bilaterally, respirations nonlabored, good inspiratory effort  Abdomen: soft, NTND. Normoactive BS. Left transgluteal drain with small amount of serosanguinous output  Extremities: no pedal edema or calf tenderness noted     I&O's Detail    04 Oct 2020 07:01  -  05 Oct 2020 07:00  --------------------------------------------------------  IN:    IV PiggyBack: 300 mL    sodium chloride 0.45%: 1350 mL  Total IN: 1650 mL    OUT:    Drain (mL): 7.5 mL    Voided (mL): 800 mL  Total OUT: 807.5 mL    Total NET: 842.5 mL      05 Oct 2020 07:01  -  06 Oct 2020 06:21  --------------------------------------------------------  IN:    IV PiggyBack: 250 mL    sodium chloride 0.45%: 1200 mL  Total IN: 1450 mL    OUT:    Drain (mL): 5.5 mL  Total OUT: 5.5 mL    Total NET: 1444.5 mL    LABS:                        13.3   7.51  )-----------( 318      ( 05 Oct 2020 07:47 )             41.6     10-05    136  |  104  |  5<L>  ----------------------------<  162<H>  3.8   |  26  |  0.70    Ca    8.9      05 Oct 2020 07:47  Phos  2.7     10-04  Mg     2.5     10-04    Culture - Body Fluid with Gram Stain (10.02.20 @ 18:45)  Gram Stain:   Numerous polymorphonuclear leukocytes per low power field  No organisms seen per oil power field  Specimen Source: .Body Fluid PELVIC ABSCESS  Culture Results:   No growth    Assessment and plan:   65F PMH HTN, DM, PSH GISELLE BSO admitted with diverticular abscess, s/p IR drainage of pelvic abscess 10/2. Patient is afebrile, no leukocytosis, clinically stable.     Plan:  IR drain care. Monitor output.  ID recommendations appreciated- will transition from Zosyn to Augmentin if negative final cultures  Awaiting final results for discharge planning, d/w patient.  Continue regular diet  continue home DM and HTN medications

## 2020-10-06 NOTE — PROGRESS NOTE ADULT - ASSESSMENT
Pelvic abscess, most likely diverticular in origin.   On adequate antibiotics  Patient states had BSO with GISELLE, though not clear why that would be done for fibroids.  No hx IBD  Due for colonoscopy, hx polyps 4y ago  No concern of fistulizing disease  10/4: S/p IR drain placement 10/2, 30cc brownish fluid was removed. No pain, no fever. Very minimal bloody fluid in drain since 11pm last night.    10/5: Overall stable. No concern of uncontrolled infection on exam  10/6: Cx NTD. Doing well.         Suggestions--  Augmentin 875mg PO Q12H x5 days in am  Potential AE addressed in clear layman's terms  Drain care as per surgery/IR  Diet, drain care as per primary team  Eventual colonoscopy  F/U OPD with surgery and GI  No ID objeciton to outpatient management.  D/W Case management    I'll sign off at this time.   Thank you for the courtesy of this referral.    Hernesto Pacheco MD  Attending Physician  [facility]  Division of Infectous Diseases  [phone]

## 2020-10-07 PROBLEM — E11.9 TYPE 2 DIABETES MELLITUS WITHOUT COMPLICATIONS: Chronic | Status: ACTIVE | Noted: 2020-10-01

## 2020-10-07 PROBLEM — I10 ESSENTIAL (PRIMARY) HYPERTENSION: Chronic | Status: ACTIVE | Noted: 2020-10-01

## 2020-10-07 LAB
CULTURE RESULTS: SIGNIFICANT CHANGE UP
GRAM STN FLD: SIGNIFICANT CHANGE UP
SPECIMEN SOURCE: SIGNIFICANT CHANGE UP

## 2020-10-13 PROBLEM — Z00.00 ENCOUNTER FOR PREVENTIVE HEALTH EXAMINATION: Status: ACTIVE | Noted: 2020-10-13

## 2020-10-15 ENCOUNTER — APPOINTMENT (OUTPATIENT)
Dept: SURGERY | Facility: CLINIC | Age: 65
End: 2020-10-15
Payer: COMMERCIAL

## 2020-10-15 VITALS
HEIGHT: 62 IN | DIASTOLIC BLOOD PRESSURE: 96 MMHG | SYSTOLIC BLOOD PRESSURE: 157 MMHG | WEIGHT: 211 LBS | TEMPERATURE: 97.5 F | BODY MASS INDEX: 38.83 KG/M2 | OXYGEN SATURATION: 96 % | HEART RATE: 109 BPM

## 2020-10-15 DIAGNOSIS — Z86.79 PERSONAL HISTORY OF OTHER DISEASES OF THE CIRCULATORY SYSTEM: ICD-10-CM

## 2020-10-15 DIAGNOSIS — Z87.19 PERSONAL HISTORY OF OTHER DISEASES OF THE DIGESTIVE SYSTEM: ICD-10-CM

## 2020-10-15 DIAGNOSIS — Z86.39 PERSONAL HISTORY OF OTHER ENDOCRINE, NUTRITIONAL AND METABOLIC DISEASE: ICD-10-CM

## 2020-10-15 DIAGNOSIS — E66.01 MORBID (SEVERE) OBESITY DUE TO EXCESS CALORIES: ICD-10-CM

## 2020-10-15 PROCEDURE — 99213 OFFICE O/P EST LOW 20 MIN: CPT

## 2020-10-15 RX ORDER — METFORMIN HYDROCHLORIDE 500 MG/1
500 TABLET, COATED ORAL
Refills: 0 | Status: ACTIVE | COMMUNITY

## 2020-10-15 RX ORDER — NIFEDIPINE 60 MG/1
60 TABLET, FILM COATED, EXTENDED RELEASE ORAL
Refills: 0 | Status: ACTIVE | COMMUNITY

## 2020-10-15 RX ORDER — AMOXICILLIN 875 MG/1
875 TABLET, FILM COATED ORAL
Refills: 0 | Status: ACTIVE | COMMUNITY

## 2020-10-15 NOTE — PHYSICAL EXAM
[de-identified] : NAD [de-identified] : Soft, nontender, nondistended.  [de-identified] : Drain in place, serosanguinous ouput.

## 2020-10-15 NOTE — ASSESSMENT
[FreeTextEntry1] : Ms. Mendoza presents following hospitalization of perforated diverticulitis and IR drain placement. Overall feeling well 10pt ROS otw negative. Denies abdominal pain, nausea, vomiting. This is her first episode of diverticulitis. She has had a colonoscopy previously with polypectomy. She has not been seen by her PCP in "awhile."  She continues to flush the drain as needed daily with normal saline. The output has been consistently less than 5cc daily. \par \par \par VNS until 11/3/20, daily flushing of drain with Normal saline\par Colonoscopy planning for Mid-November 2020, will refer to Dr. Rucker \par IR drain assessment pending. \par

## 2020-10-15 NOTE — HISTORY OF PRESENT ILLNESS
[de-identified] : Ms. Mendoza presents following hospitalization of perforated diverticulitis and IR drain placement.  [de-identified] : Ms. Mendoza presents following hospitalization of perforated diverticulitis and IR drain placement. Overall feeling well 10pt ROS otw negative. Denies abdominal pain, nausea, vomiting. This is her first episode of diverticulitis. She has had a colonoscopy previously with polypectomy. She has not been seen by her PCP in "awhile."  She continues to flush the drain as needed daily with normal saline. The output has been consistently less than 5cc daily.

## 2020-10-16 DIAGNOSIS — E83.39 OTHER DISORDERS OF PHOSPHORUS METABOLISM: ICD-10-CM

## 2020-10-16 DIAGNOSIS — K57.20 DIVERTICULITIS OF LARGE INTESTINE WITH PERFORATION AND ABSCESS WITHOUT BLEEDING: ICD-10-CM

## 2020-10-16 DIAGNOSIS — Z90.710 ACQUIRED ABSENCE OF BOTH CERVIX AND UTERUS: ICD-10-CM

## 2020-10-16 DIAGNOSIS — Z90.79 ACQUIRED ABSENCE OF OTHER GENITAL ORGAN(S): ICD-10-CM

## 2020-10-16 DIAGNOSIS — K76.0 FATTY (CHANGE OF) LIVER, NOT ELSEWHERE CLASSIFIED: ICD-10-CM

## 2020-10-16 DIAGNOSIS — K63.0 ABSCESS OF INTESTINE: ICD-10-CM

## 2020-10-16 DIAGNOSIS — Z90.722 ACQUIRED ABSENCE OF OVARIES, BILATERAL: ICD-10-CM

## 2020-10-19 ENCOUNTER — RESULT REVIEW (OUTPATIENT)
Age: 65
End: 2020-10-19

## 2020-10-19 ENCOUNTER — OUTPATIENT (OUTPATIENT)
Dept: OUTPATIENT SERVICES | Facility: HOSPITAL | Age: 65
LOS: 1 days | Discharge: HOME | End: 2020-10-19
Payer: COMMERCIAL

## 2020-10-19 ENCOUNTER — APPOINTMENT (OUTPATIENT)
Age: 65
End: 2020-10-19

## 2020-10-19 DIAGNOSIS — Z90.710 ACQUIRED ABSENCE OF BOTH CERVIX AND UTERUS: Chronic | ICD-10-CM

## 2020-10-19 DIAGNOSIS — Z98.891 HISTORY OF UTERINE SCAR FROM PREVIOUS SURGERY: Chronic | ICD-10-CM

## 2020-10-19 DIAGNOSIS — R10.2 PELVIC AND PERINEAL PAIN: ICD-10-CM

## 2020-10-19 PROCEDURE — 76080 X-RAY EXAM OF FISTULA: CPT | Mod: 26

## 2020-10-19 PROCEDURE — 72192 CT PELVIS W/O DYE: CPT | Mod: 26

## 2020-10-19 PROCEDURE — 49424 ASSESS CYST CONTRAST INJECT: CPT

## 2020-12-11 ENCOUNTER — TRANSCRIPTION ENCOUNTER (OUTPATIENT)
Age: 65
End: 2020-12-11

## 2023-02-24 NOTE — PROCEDURE NOTE - IR COMPLICATIONS
Need for prophylactic measure No complications Need for prophylactic measure Need for prophylactic measure
